# Patient Record
Sex: FEMALE | Race: WHITE | NOT HISPANIC OR LATINO | Employment: OTHER | URBAN - METROPOLITAN AREA
[De-identification: names, ages, dates, MRNs, and addresses within clinical notes are randomized per-mention and may not be internally consistent; named-entity substitution may affect disease eponyms.]

---

## 2021-11-02 RX ORDER — LEVOTHYROXINE SODIUM 0.07 MG/1
75 TABLET ORAL DAILY
COMMUNITY

## 2021-11-02 RX ORDER — PSEUDOEPHEDRINE HCL 30 MG
30 TABLET ORAL EVERY 4 HOURS PRN
Status: ON HOLD | COMMUNITY
End: 2021-11-10

## 2021-11-02 RX ORDER — ASCORBIC ACID 500 MG
500 TABLET ORAL DAILY
Status: ON HOLD | COMMUNITY
End: 2021-11-11

## 2021-11-02 RX ORDER — MULTIPLE VITAMINS W/ MINERALS TAB 9MG-400MCG
1 TAB ORAL DAILY
COMMUNITY

## 2021-11-02 RX ORDER — CITALOPRAM 20 MG/1
20 TABLET ORAL NIGHTLY
COMMUNITY
End: 2021-11-24 | Stop reason: ALTCHOICE

## 2021-11-02 RX ORDER — FLUTICASONE PROPIONATE 50 MCG
1 SPRAY, SUSPENSION (ML) NASAL DAILY
Status: ON HOLD | COMMUNITY
End: 2021-11-10

## 2021-11-02 RX ORDER — LORATADINE 10 MG/1
10 TABLET ORAL DAILY PRN
COMMUNITY

## 2021-11-02 RX ORDER — NAPROXEN SODIUM 220 MG
220 TABLET ORAL DAILY PRN
Status: ON HOLD | COMMUNITY
End: 2021-11-10

## 2021-11-02 RX ORDER — DEXTROAMPHETAMINE SACCHARATE, AMPHETAMINE ASPARTATE, DEXTROAMPHETAMINE SULFATE AND AMPHETAMINE SULFATE 5; 5; 5; 5 MG/1; MG/1; MG/1; MG/1
20 TABLET ORAL 2 TIMES DAILY PRN
Status: ON HOLD | COMMUNITY
End: 2021-11-10

## 2021-11-02 RX ORDER — DICYCLOMINE HYDROCHLORIDE 10 MG/1
10 CAPSULE ORAL 3 TIMES DAILY PRN
COMMUNITY

## 2021-11-02 RX ORDER — SPIRONOLACTONE 50 MG/1
50 TABLET, FILM COATED ORAL DAILY
Status: ON HOLD | COMMUNITY
End: 2021-11-10

## 2021-11-02 RX ORDER — OMEPRAZOLE 40 MG/1
40 CAPSULE, DELAYED RELEASE ORAL 2 TIMES DAILY
Status: ON HOLD | COMMUNITY
End: 2021-11-10

## 2021-11-02 RX ORDER — IBUPROFEN 200 MG
200 TABLET ORAL AS NEEDED
Status: ON HOLD | COMMUNITY
End: 2021-11-10

## 2021-11-02 RX ORDER — BUPROPION HYDROCHLORIDE 150 MG/1
150 TABLET, EXTENDED RELEASE ORAL NIGHTLY
Status: ON HOLD | COMMUNITY
End: 2021-11-10

## 2021-11-02 RX ORDER — ZOLPIDEM TARTRATE 10 MG/1
10 TABLET ORAL NIGHTLY PRN
Status: ON HOLD | COMMUNITY
End: 2021-11-11

## 2021-11-02 RX ORDER — ACETAMINOPHEN AND CODEINE PHOSPHATE 300; 30 MG/1; MG/1
1 TABLET ORAL AS NEEDED
Status: ON HOLD | COMMUNITY
End: 2021-11-10

## 2021-11-03 ENCOUNTER — HOSPITAL ENCOUNTER (OUTPATIENT)
Dept: GENERAL RADIOLOGY | Facility: HOSPITAL | Age: 66
Discharge: HOME | End: 2021-11-03

## 2021-11-03 ENCOUNTER — LAB (OUTPATIENT)
Dept: LAB | Facility: HOSPITAL | Age: 66
End: 2021-11-03

## 2021-11-03 ENCOUNTER — HOSPITAL ENCOUNTER (OUTPATIENT)
Dept: CARDIOLOGY | Facility: HOSPITAL | Age: 66
Discharge: HOME | End: 2021-11-03

## 2021-11-03 LAB
BASOPHILS # BLD AUTO: 0.1 10*3/MM3 (ref 0–0.2)
BASOPHILS NFR BLD AUTO: 1.2 % (ref 0–1.5)
DEPRECATED RDW RBC AUTO: 40.9 FL (ref 37–54)
EOSINOPHIL # BLD AUTO: 0.13 10*3/MM3 (ref 0–0.4)
EOSINOPHIL NFR BLD AUTO: 1.6 % (ref 0.3–6.2)
ERYTHROCYTE [DISTWIDTH] IN BLOOD BY AUTOMATED COUNT: 12.4 % (ref 12.3–15.4)
HCT VFR BLD AUTO: 37.4 % (ref 34–46.6)
HGB BLD-MCNC: 12.2 G/DL (ref 12–15.9)
IMM GRANULOCYTES # BLD AUTO: 0.03 10*3/MM3 (ref 0–0.05)
IMM GRANULOCYTES NFR BLD AUTO: 0.4 % (ref 0–0.5)
LYMPHOCYTES # BLD AUTO: 3.23 10*3/MM3 (ref 0.7–3.1)
LYMPHOCYTES NFR BLD AUTO: 39.8 % (ref 19.6–45.3)
MCH RBC QN AUTO: 29.8 PG (ref 26.6–33)
MCHC RBC AUTO-ENTMCNC: 32.6 G/DL (ref 31.5–35.7)
MCV RBC AUTO: 91.4 FL (ref 79–97)
MONOCYTES # BLD AUTO: 0.8 10*3/MM3 (ref 0.1–0.9)
MONOCYTES NFR BLD AUTO: 9.9 % (ref 5–12)
NEUTROPHILS NFR BLD AUTO: 3.83 10*3/MM3 (ref 1.7–7)
NEUTROPHILS NFR BLD AUTO: 47.1 % (ref 42.7–76)
NRBC BLD AUTO-RTO: 0 /100 WBC (ref 0–0.2)
PLATELET # BLD AUTO: 369 10*3/MM3 (ref 140–450)
PMV BLD AUTO: 10 FL (ref 6–12)
QT INTERVAL: 334 MS
RBC # BLD AUTO: 4.09 10*6/MM3 (ref 3.77–5.28)
WBC # BLD AUTO: 8.12 10*3/MM3 (ref 3.4–10.8)

## 2021-11-03 PROCEDURE — 93005 ELECTROCARDIOGRAM TRACING: CPT | Performed by: ORTHOPAEDIC SURGERY

## 2021-11-03 PROCEDURE — 85025 COMPLETE CBC W/AUTO DIFF WBC: CPT

## 2021-11-03 PROCEDURE — 93010 ELECTROCARDIOGRAM REPORT: CPT | Performed by: INTERNAL MEDICINE

## 2021-11-03 PROCEDURE — 71046 X-RAY EXAM CHEST 2 VIEWS: CPT

## 2021-11-08 ENCOUNTER — LAB (OUTPATIENT)
Dept: LAB | Facility: HOSPITAL | Age: 66
End: 2021-11-08

## 2021-11-08 PROCEDURE — U0005 INFEC AGEN DETEC AMPLI PROBE: HCPCS

## 2021-11-08 PROCEDURE — C9803 HOPD COVID-19 SPEC COLLECT: HCPCS

## 2021-11-08 PROCEDURE — U0004 COV-19 TEST NON-CDC HGH THRU: HCPCS

## 2021-11-09 ENCOUNTER — ANESTHESIA EVENT (OUTPATIENT)
Dept: PERIOP | Facility: HOSPITAL | Age: 66
End: 2021-11-09

## 2021-11-09 LAB — SARS-COV-2 ORF1AB RESP QL NAA+PROBE: NOT DETECTED

## 2021-11-10 ENCOUNTER — APPOINTMENT (OUTPATIENT)
Dept: GENERAL RADIOLOGY | Facility: HOSPITAL | Age: 66
End: 2021-11-10

## 2021-11-10 ENCOUNTER — HOSPITAL ENCOUNTER (INPATIENT)
Facility: HOSPITAL | Age: 66
LOS: 2 days | Discharge: NURSING, REHAB OR LONG-TERM CARE | End: 2021-11-13
Attending: ORTHOPAEDIC SURGERY | Admitting: ORTHOPAEDIC SURGERY

## 2021-11-10 ENCOUNTER — ANESTHESIA (OUTPATIENT)
Dept: PERIOP | Facility: HOSPITAL | Age: 66
End: 2021-11-10

## 2021-11-10 DIAGNOSIS — M16.11 PRIMARY OSTEOARTHRITIS OF RIGHT HIP: Primary | ICD-10-CM

## 2021-11-10 PROBLEM — F90.0 ATTENTION DEFICIT HYPERACTIVITY DISORDER, PREDOMINANTLY INATTENTIVE TYPE: Status: ACTIVE | Noted: 2021-11-10

## 2021-11-10 PROBLEM — E78.2 MIXED HYPERLIPIDEMIA: Chronic | Status: ACTIVE | Noted: 2019-12-09

## 2021-11-10 PROBLEM — K21.9 GASTROESOPHAGEAL REFLUX DISEASE: Status: ACTIVE | Noted: 2021-11-10

## 2021-11-10 PROBLEM — F41.9 ANXIETY DISORDER: Chronic | Status: ACTIVE | Noted: 2021-11-10

## 2021-11-10 PROBLEM — E03.9 HYPOTHYROIDISM: Chronic | Status: ACTIVE | Noted: 2021-11-10

## 2021-11-10 PROBLEM — M54.2 NECK PAIN: Status: ACTIVE | Noted: 2021-11-10

## 2021-11-10 PROBLEM — F41.9 ANXIETY DISORDER: Status: ACTIVE | Noted: 2021-11-10

## 2021-11-10 PROBLEM — F32.A DEPRESSIVE DISORDER: Status: ACTIVE | Noted: 2019-05-02

## 2021-11-10 PROBLEM — K58.9 IRRITABLE BOWEL SYNDROME: Chronic | Status: ACTIVE | Noted: 2021-11-10

## 2021-11-10 PROBLEM — K58.9 IRRITABLE BOWEL SYNDROME: Status: ACTIVE | Noted: 2021-11-10

## 2021-11-10 PROBLEM — F90.0 ATTENTION DEFICIT HYPERACTIVITY DISORDER, PREDOMINANTLY INATTENTIVE TYPE: Chronic | Status: ACTIVE | Noted: 2021-11-10

## 2021-11-10 PROBLEM — G47.00 INSOMNIA: Chronic | Status: ACTIVE | Noted: 2021-11-10

## 2021-11-10 PROBLEM — E66.3 OVERWEIGHT (BMI 25.0-29.9): Chronic | Status: ACTIVE | Noted: 2021-11-10

## 2021-11-10 PROBLEM — E03.9 HYPOTHYROIDISM: Status: ACTIVE | Noted: 2021-11-10

## 2021-11-10 PROBLEM — G89.29 CHRONIC BACK PAIN: Status: ACTIVE | Noted: 2021-11-10

## 2021-11-10 PROBLEM — K21.9 GASTROESOPHAGEAL REFLUX DISEASE: Chronic | Status: ACTIVE | Noted: 2021-11-10

## 2021-11-10 PROBLEM — M81.0 OSTEOPOROSIS: Status: ACTIVE | Noted: 2021-11-10

## 2021-11-10 PROBLEM — M54.9 CHRONIC BACK PAIN: Status: ACTIVE | Noted: 2021-11-10

## 2021-11-10 PROBLEM — E78.5 HYPERLIPIDEMIA: Status: ACTIVE | Noted: 2019-12-09

## 2021-11-10 PROBLEM — F32.A DEPRESSIVE DISORDER: Chronic | Status: ACTIVE | Noted: 2019-05-02

## 2021-11-10 LAB
BACTERIA UR QL AUTO: NORMAL /HPF
HYALINE CASTS UR QL AUTO: NORMAL /LPF
RBC # UR: NORMAL /HPF
REF LAB TEST METHOD: NORMAL
SQUAMOUS #/AREA URNS HPF: NORMAL /HPF
WBC UR QL AUTO: NORMAL /HPF

## 2021-11-10 PROCEDURE — C1776 JOINT DEVICE (IMPLANTABLE): HCPCS | Performed by: ORTHOPAEDIC SURGERY

## 2021-11-10 PROCEDURE — 25010000002 CEFAZOLIN PER 500 MG: Performed by: ORTHOPAEDIC SURGERY

## 2021-11-10 PROCEDURE — G0378 HOSPITAL OBSERVATION PER HR: HCPCS

## 2021-11-10 PROCEDURE — 27130 TOTAL HIP ARTHROPLASTY: CPT | Performed by: NURSE PRACTITIONER

## 2021-11-10 PROCEDURE — 25010000002 ONDANSETRON PER 1 MG

## 2021-11-10 PROCEDURE — 25010000002 PROPOFOL 10 MG/ML EMULSION

## 2021-11-10 PROCEDURE — 25010000002 MIDAZOLAM PER 1 MG

## 2021-11-10 PROCEDURE — 0SR90JA REPLACEMENT OF RIGHT HIP JOINT WITH SYNTHETIC SUBSTITUTE, UNCEMENTED, OPEN APPROACH: ICD-10-PCS | Performed by: ORTHOPAEDIC SURGERY

## 2021-11-10 PROCEDURE — 25010000002 FENTANYL CITRATE (PF) 50 MCG/ML SOLUTION

## 2021-11-10 PROCEDURE — 25010000002 DEXAMETHASONE PER 1 MG

## 2021-11-10 PROCEDURE — 25010000002 HYDROMORPHONE PER 4 MG

## 2021-11-10 PROCEDURE — 97162 PT EVAL MOD COMPLEX 30 MIN: CPT

## 2021-11-10 PROCEDURE — 99222 1ST HOSP IP/OBS MODERATE 55: CPT | Performed by: INTERNAL MEDICINE

## 2021-11-10 PROCEDURE — 81015 MICROSCOPIC EXAM OF URINE: CPT | Performed by: ORTHOPAEDIC SURGERY

## 2021-11-10 PROCEDURE — 97110 THERAPEUTIC EXERCISES: CPT

## 2021-11-10 PROCEDURE — 73501 X-RAY EXAM HIP UNI 1 VIEW: CPT

## 2021-11-10 PROCEDURE — 76000 FLUOROSCOPY <1 HR PHYS/QHP: CPT

## 2021-11-10 PROCEDURE — 25010000002 NEOSTIGMINE 5 MG/5ML SOLUTION

## 2021-11-10 PROCEDURE — 25010000002 MAGNESIUM SULFATE PER 500 MG OF MAGNESIUM

## 2021-11-10 DEVICE — LINER ACET G7 VIVACIT/E NTRL HXPE SZF 36MM: Type: IMPLANTABLE DEVICE | Site: HIP | Status: FUNCTIONAL

## 2021-11-10 DEVICE — CP HIP UPCHRG OSSEOTI LTD HL CUPS: Type: IMPLANTABLE DEVICE | Site: HIP | Status: FUNCTIONAL

## 2021-11-10 DEVICE — SHLL ACET OSSEOTI G7 4H SZF 54MM: Type: IMPLANTABLE DEVICE | Site: HIP | Status: FUNCTIONAL

## 2021-11-10 DEVICE — STEM FEM/HIP AVENIRCOMPLETE COLAR STFF HA SZ5: Type: IMPLANTABLE DEVICE | Site: HIP | Status: FUNCTIONAL

## 2021-11-10 DEVICE — SCRW ACET CORT TRILOGY S/TAP 6.5X15: Type: IMPLANTABLE DEVICE | Site: HIP | Status: FUNCTIONAL

## 2021-11-10 DEVICE — BIOLOX® DELTA, CERAMIC FEMORAL HEAD, S, Ø 36/-3.5, TAPER 12/14
Type: IMPLANTABLE DEVICE | Site: HIP | Status: FUNCTIONAL
Brand: BIOLOX® DELTA

## 2021-11-10 DEVICE — TOTAL HIP PRIMARY: Type: IMPLANTABLE DEVICE | Site: HIP | Status: FUNCTIONAL

## 2021-11-10 RX ORDER — HYDROMORPHONE HCL 110MG/55ML
0.5 PATIENT CONTROLLED ANALGESIA SYRINGE INTRAVENOUS
Status: DISCONTINUED | OUTPATIENT
Start: 2021-11-10 | End: 2021-11-10 | Stop reason: HOSPADM

## 2021-11-10 RX ORDER — ASCORBIC ACID 500 MG
500 TABLET ORAL DAILY
Status: DISCONTINUED | OUTPATIENT
Start: 2021-11-10 | End: 2021-11-13 | Stop reason: HOSPADM

## 2021-11-10 RX ORDER — CITALOPRAM 20 MG/1
20 TABLET ORAL NIGHTLY
Status: DISCONTINUED | OUTPATIENT
Start: 2021-11-10 | End: 2021-11-13 | Stop reason: HOSPADM

## 2021-11-10 RX ORDER — OXYCODONE HYDROCHLORIDE 5 MG/1
10 TABLET ORAL EVERY 4 HOURS PRN
Status: DISCONTINUED | OUTPATIENT
Start: 2021-11-10 | End: 2021-11-11 | Stop reason: SDUPTHER

## 2021-11-10 RX ORDER — MULTIPLE VITAMINS W/ MINERALS TAB 9MG-400MCG
1 TAB ORAL DAILY
Status: DISCONTINUED | OUTPATIENT
Start: 2021-11-10 | End: 2021-11-13 | Stop reason: HOSPADM

## 2021-11-10 RX ORDER — NALOXONE HCL 0.4 MG/ML
0.1 VIAL (ML) INJECTION
Status: DISCONTINUED | OUTPATIENT
Start: 2021-11-10 | End: 2021-11-13 | Stop reason: HOSPADM

## 2021-11-10 RX ORDER — EPHEDRINE SULFATE 5 MG/ML
5 INJECTION INTRAVENOUS ONCE AS NEEDED
Status: DISCONTINUED | OUTPATIENT
Start: 2021-11-10 | End: 2021-11-10 | Stop reason: HOSPADM

## 2021-11-10 RX ORDER — ZOLPIDEM TARTRATE 5 MG/1
5 TABLET ORAL NIGHTLY PRN
Status: DISCONTINUED | OUTPATIENT
Start: 2021-11-10 | End: 2021-11-13 | Stop reason: HOSPADM

## 2021-11-10 RX ORDER — LABETALOL HYDROCHLORIDE 5 MG/ML
5 INJECTION, SOLUTION INTRAVENOUS
Status: DISCONTINUED | OUTPATIENT
Start: 2021-11-10 | End: 2021-11-10 | Stop reason: HOSPADM

## 2021-11-10 RX ORDER — EPHEDRINE SULFATE 5 MG/ML
INJECTION INTRAVENOUS AS NEEDED
Status: DISCONTINUED | OUTPATIENT
Start: 2021-11-10 | End: 2021-11-10 | Stop reason: SURG

## 2021-11-10 RX ORDER — ONDANSETRON 4 MG/1
4 TABLET, FILM COATED ORAL EVERY 6 HOURS PRN
Status: CANCELLED | OUTPATIENT
Start: 2021-11-10

## 2021-11-10 RX ORDER — FENTANYL CITRATE 50 UG/ML
INJECTION, SOLUTION INTRAMUSCULAR; INTRAVENOUS AS NEEDED
Status: DISCONTINUED | OUTPATIENT
Start: 2021-11-10 | End: 2021-11-10 | Stop reason: SURG

## 2021-11-10 RX ORDER — PROMETHAZINE HYDROCHLORIDE 25 MG/1
25 TABLET ORAL ONCE AS NEEDED
Status: DISCONTINUED | OUTPATIENT
Start: 2021-11-10 | End: 2021-11-10 | Stop reason: HOSPADM

## 2021-11-10 RX ORDER — LIDOCAINE HYDROCHLORIDE 20 MG/ML
INJECTION, SOLUTION EPIDURAL; INFILTRATION; INTRACAUDAL; PERINEURAL AS NEEDED
Status: DISCONTINUED | OUTPATIENT
Start: 2021-11-10 | End: 2021-11-10 | Stop reason: SURG

## 2021-11-10 RX ORDER — GLYCOPYRROLATE 1 MG/5 ML
SYRINGE (ML) INTRAVENOUS AS NEEDED
Status: DISCONTINUED | OUTPATIENT
Start: 2021-11-10 | End: 2021-11-10 | Stop reason: SURG

## 2021-11-10 RX ORDER — FAMOTIDINE 20 MG/1
40 TABLET, FILM COATED ORAL DAILY
Status: CANCELLED | OUTPATIENT
Start: 2021-11-10

## 2021-11-10 RX ORDER — NALOXONE HCL 0.4 MG/ML
0.4 VIAL (ML) INJECTION AS NEEDED
Status: DISCONTINUED | OUTPATIENT
Start: 2021-11-10 | End: 2021-11-10 | Stop reason: HOSPADM

## 2021-11-10 RX ORDER — FENTANYL CITRATE 50 UG/ML
50 INJECTION, SOLUTION INTRAMUSCULAR; INTRAVENOUS
Status: DISCONTINUED | OUTPATIENT
Start: 2021-11-10 | End: 2021-11-10 | Stop reason: HOSPADM

## 2021-11-10 RX ORDER — LEVOTHYROXINE SODIUM 0.07 MG/1
75 TABLET ORAL DAILY
Status: DISCONTINUED | OUTPATIENT
Start: 2021-11-10 | End: 2021-11-13 | Stop reason: HOSPADM

## 2021-11-10 RX ORDER — HYDROMORPHONE HCL 110MG/55ML
PATIENT CONTROLLED ANALGESIA SYRINGE INTRAVENOUS AS NEEDED
Status: DISCONTINUED | OUTPATIENT
Start: 2021-11-10 | End: 2021-11-10 | Stop reason: SURG

## 2021-11-10 RX ORDER — KETAMINE HCL IN NACL, ISO-OSM 100MG/10ML
SYRINGE (ML) INJECTION AS NEEDED
Status: DISCONTINUED | OUTPATIENT
Start: 2021-11-10 | End: 2021-11-10 | Stop reason: SURG

## 2021-11-10 RX ORDER — CETIRIZINE HYDROCHLORIDE 10 MG/1
10 TABLET ORAL DAILY
Status: DISCONTINUED | OUTPATIENT
Start: 2021-11-10 | End: 2021-11-13 | Stop reason: HOSPADM

## 2021-11-10 RX ORDER — NALOXONE HCL 0.4 MG/ML
0.1 VIAL (ML) INJECTION
Status: CANCELLED | OUTPATIENT
Start: 2021-11-10

## 2021-11-10 RX ORDER — HYDRALAZINE HYDROCHLORIDE 20 MG/ML
5 INJECTION INTRAMUSCULAR; INTRAVENOUS
Status: DISCONTINUED | OUTPATIENT
Start: 2021-11-10 | End: 2021-11-10 | Stop reason: HOSPADM

## 2021-11-10 RX ORDER — NEOSTIGMINE METHYLSULFATE 5 MG/5 ML
SYRINGE (ML) INTRAVENOUS AS NEEDED
Status: DISCONTINUED | OUTPATIENT
Start: 2021-11-10 | End: 2021-11-10 | Stop reason: SURG

## 2021-11-10 RX ORDER — ONDANSETRON 2 MG/ML
4 INJECTION INTRAMUSCULAR; INTRAVENOUS EVERY 6 HOURS PRN
Status: DISCONTINUED | OUTPATIENT
Start: 2021-11-10 | End: 2021-11-13 | Stop reason: HOSPADM

## 2021-11-10 RX ORDER — ROCURONIUM BROMIDE 10 MG/ML
INJECTION, SOLUTION INTRAVENOUS AS NEEDED
Status: DISCONTINUED | OUTPATIENT
Start: 2021-11-10 | End: 2021-11-10 | Stop reason: SURG

## 2021-11-10 RX ORDER — SODIUM CHLORIDE 0.9 % (FLUSH) 0.9 %
10 SYRINGE (ML) INJECTION AS NEEDED
Status: DISCONTINUED | OUTPATIENT
Start: 2021-11-10 | End: 2021-11-10 | Stop reason: HOSPADM

## 2021-11-10 RX ORDER — HYDROMORPHONE HCL 110MG/55ML
0.5 PATIENT CONTROLLED ANALGESIA SYRINGE INTRAVENOUS
Status: DISCONTINUED | OUTPATIENT
Start: 2021-11-10 | End: 2021-11-13 | Stop reason: HOSPADM

## 2021-11-10 RX ORDER — SODIUM CHLORIDE, SODIUM LACTATE, POTASSIUM CHLORIDE, CALCIUM CHLORIDE 600; 310; 30; 20 MG/100ML; MG/100ML; MG/100ML; MG/100ML
1000 INJECTION, SOLUTION INTRAVENOUS CONTINUOUS
Status: DISCONTINUED | OUTPATIENT
Start: 2021-11-10 | End: 2021-11-10

## 2021-11-10 RX ORDER — SODIUM CHLORIDE, SODIUM LACTATE, POTASSIUM CHLORIDE, CALCIUM CHLORIDE 600; 310; 30; 20 MG/100ML; MG/100ML; MG/100ML; MG/100ML
75 INJECTION, SOLUTION INTRAVENOUS CONTINUOUS
Status: DISCONTINUED | OUTPATIENT
Start: 2021-11-10 | End: 2021-11-13 | Stop reason: HOSPADM

## 2021-11-10 RX ORDER — ONDANSETRON 2 MG/ML
4 INJECTION INTRAMUSCULAR; INTRAVENOUS ONCE AS NEEDED
Status: DISCONTINUED | OUTPATIENT
Start: 2021-11-10 | End: 2021-11-10 | Stop reason: HOSPADM

## 2021-11-10 RX ORDER — DEXAMETHASONE SODIUM PHOSPHATE 4 MG/ML
INJECTION, SOLUTION INTRA-ARTICULAR; INTRALESIONAL; INTRAMUSCULAR; INTRAVENOUS; SOFT TISSUE AS NEEDED
Status: DISCONTINUED | OUTPATIENT
Start: 2021-11-10 | End: 2021-11-10 | Stop reason: SURG

## 2021-11-10 RX ORDER — LIDOCAINE HYDROCHLORIDE 10 MG/ML
0.5 INJECTION, SOLUTION INFILTRATION; PERINEURAL ONCE AS NEEDED
Status: DISCONTINUED | OUTPATIENT
Start: 2021-11-10 | End: 2021-11-10 | Stop reason: HOSPADM

## 2021-11-10 RX ORDER — TRANEXAMIC ACID 10 MG/ML
1000 INJECTION, SOLUTION INTRAVENOUS ONCE
Status: COMPLETED | OUTPATIENT
Start: 2021-11-10 | End: 2021-11-10

## 2021-11-10 RX ORDER — ONDANSETRON 2 MG/ML
INJECTION INTRAMUSCULAR; INTRAVENOUS AS NEEDED
Status: DISCONTINUED | OUTPATIENT
Start: 2021-11-10 | End: 2021-11-10 | Stop reason: SURG

## 2021-11-10 RX ORDER — SODIUM CHLORIDE, SODIUM LACTATE, POTASSIUM CHLORIDE, CALCIUM CHLORIDE 600; 310; 30; 20 MG/100ML; MG/100ML; MG/100ML; MG/100ML
75 INJECTION, SOLUTION INTRAVENOUS CONTINUOUS
Status: CANCELLED | OUTPATIENT
Start: 2021-11-10

## 2021-11-10 RX ORDER — PROPOFOL 10 MG/ML
VIAL (ML) INTRAVENOUS AS NEEDED
Status: DISCONTINUED | OUTPATIENT
Start: 2021-11-10 | End: 2021-11-10 | Stop reason: SURG

## 2021-11-10 RX ORDER — HYDROMORPHONE HCL 110MG/55ML
0.5 PATIENT CONTROLLED ANALGESIA SYRINGE INTRAVENOUS
Status: CANCELLED | OUTPATIENT
Start: 2021-11-10 | End: 2021-11-17

## 2021-11-10 RX ORDER — HYDROCODONE BITARTRATE AND ACETAMINOPHEN 5; 325 MG/1; MG/1
1 TABLET ORAL EVERY 4 HOURS PRN
Status: DISCONTINUED | OUTPATIENT
Start: 2021-11-10 | End: 2021-11-13 | Stop reason: HOSPADM

## 2021-11-10 RX ORDER — FAMOTIDINE 20 MG/1
40 TABLET, FILM COATED ORAL DAILY
Status: DISCONTINUED | OUTPATIENT
Start: 2021-11-10 | End: 2021-11-11

## 2021-11-10 RX ORDER — DICYCLOMINE HYDROCHLORIDE 10 MG/1
10 CAPSULE ORAL 3 TIMES DAILY
Status: DISCONTINUED | OUTPATIENT
Start: 2021-11-10 | End: 2021-11-13 | Stop reason: HOSPADM

## 2021-11-10 RX ORDER — ONDANSETRON 2 MG/ML
4 INJECTION INTRAMUSCULAR; INTRAVENOUS EVERY 6 HOURS PRN
Status: CANCELLED | OUTPATIENT
Start: 2021-11-10

## 2021-11-10 RX ORDER — MIDAZOLAM HYDROCHLORIDE 1 MG/ML
INJECTION INTRAMUSCULAR; INTRAVENOUS AS NEEDED
Status: DISCONTINUED | OUTPATIENT
Start: 2021-11-10 | End: 2021-11-10 | Stop reason: SURG

## 2021-11-10 RX ORDER — HYDROCODONE BITARTRATE AND ACETAMINOPHEN 5; 325 MG/1; MG/1
1 TABLET ORAL EVERY 4 HOURS PRN
Status: CANCELLED | OUTPATIENT
Start: 2021-11-10 | End: 2021-11-20

## 2021-11-10 RX ORDER — ACETAMINOPHEN 500 MG
TABLET ORAL AS NEEDED
Status: DISCONTINUED | OUTPATIENT
Start: 2021-11-10 | End: 2021-11-10 | Stop reason: SURG

## 2021-11-10 RX ORDER — FLUMAZENIL 0.1 MG/ML
0.2 INJECTION INTRAVENOUS AS NEEDED
Status: DISCONTINUED | OUTPATIENT
Start: 2021-11-10 | End: 2021-11-10 | Stop reason: HOSPADM

## 2021-11-10 RX ORDER — LIDOCAINE HYDROCHLORIDE 10 MG/ML
INJECTION, SOLUTION EPIDURAL; INFILTRATION; INTRACAUDAL; PERINEURAL AS NEEDED
Status: DISCONTINUED | OUTPATIENT
Start: 2021-11-10 | End: 2021-11-10 | Stop reason: SURG

## 2021-11-10 RX ORDER — SCOLOPAMINE TRANSDERMAL SYSTEM 1 MG/1
PATCH, EXTENDED RELEASE TRANSDERMAL AS NEEDED
Status: DISCONTINUED | OUTPATIENT
Start: 2021-11-10 | End: 2021-11-10 | Stop reason: SURG

## 2021-11-10 RX ORDER — OXYCODONE HYDROCHLORIDE 5 MG/1
10 TABLET ORAL EVERY 4 HOURS PRN
Status: DISCONTINUED | OUTPATIENT
Start: 2021-11-10 | End: 2021-11-13 | Stop reason: HOSPADM

## 2021-11-10 RX ORDER — ONDANSETRON 4 MG/1
4 TABLET, FILM COATED ORAL EVERY 6 HOURS PRN
Status: DISCONTINUED | OUTPATIENT
Start: 2021-11-10 | End: 2021-11-13 | Stop reason: HOSPADM

## 2021-11-10 RX ORDER — MAGNESIUM SULFATE HEPTAHYDRATE 500 MG/ML
INJECTION, SOLUTION INTRAMUSCULAR; INTRAVENOUS AS NEEDED
Status: DISCONTINUED | OUTPATIENT
Start: 2021-11-10 | End: 2021-11-10 | Stop reason: SURG

## 2021-11-10 RX ADMIN — OXYCODONE 10 MG: 5 TABLET ORAL at 15:31

## 2021-11-10 RX ADMIN — FENTANYL CITRATE 50 MCG: 50 INJECTION, SOLUTION INTRAMUSCULAR; INTRAVENOUS at 09:57

## 2021-11-10 RX ADMIN — GLYCOPYRROLATE 1 MCG: 0.2 INJECTION, SOLUTION INTRAMUSCULAR; INTRAVITREAL at 11:34

## 2021-11-10 RX ADMIN — Medication 50 MG: at 10:07

## 2021-11-10 RX ADMIN — SODIUM CHLORIDE, POTASSIUM CHLORIDE, SODIUM LACTATE AND CALCIUM CHLORIDE: 600; 310; 30; 20 INJECTION, SOLUTION INTRAVENOUS at 11:16

## 2021-11-10 RX ADMIN — ROCURONIUM BROMIDE 20 MG: 10 INJECTION INTRAVENOUS at 11:05

## 2021-11-10 RX ADMIN — DICYCLOMINE HYDROCHLORIDE 10 MG: 10 CAPSULE ORAL at 17:03

## 2021-11-10 RX ADMIN — OXYCODONE HYDROCHLORIDE AND ACETAMINOPHEN 500 MG: 500 TABLET ORAL at 17:03

## 2021-11-10 RX ADMIN — MULTIPLE VITAMINS W/ MINERALS TAB 1 TABLET: TAB at 17:02

## 2021-11-10 RX ADMIN — CEFAZOLIN SODIUM 2 G: 1 INJECTION, POWDER, FOR SOLUTION INTRAMUSCULAR; INTRAVENOUS at 10:07

## 2021-11-10 RX ADMIN — LIDOCAINE HYDROCHLORIDE 100 MG: 20 INJECTION, SOLUTION EPIDURAL; INFILTRATION; INTRACAUDAL; PERINEURAL at 10:07

## 2021-11-10 RX ADMIN — CETIRIZINE HYDROCHLORIDE 10 MG: 10 TABLET, FILM COATED ORAL at 17:03

## 2021-11-10 RX ADMIN — LIDOCAINE HYDROCHLORIDE 50 MG: 10 INJECTION, SOLUTION EPIDURAL; INFILTRATION; INTRACAUDAL; PERINEURAL at 10:04

## 2021-11-10 RX ADMIN — CEFAZOLIN SODIUM 2 G: 10 INJECTION, POWDER, FOR SOLUTION INTRAVENOUS at 17:02

## 2021-11-10 RX ADMIN — SODIUM CHLORIDE, POTASSIUM CHLORIDE, SODIUM LACTATE AND CALCIUM CHLORIDE 75 ML/HR: 600; 310; 30; 20 INJECTION, SOLUTION INTRAVENOUS at 17:13

## 2021-11-10 RX ADMIN — Medication 5 MG: at 11:34

## 2021-11-10 RX ADMIN — EPHEDRINE SULFATE 10 MG: 5 INJECTION INTRAVENOUS at 11:17

## 2021-11-10 RX ADMIN — SCOPALAMINE 1 PATCH: 1 PATCH, EXTENDED RELEASE TRANSDERMAL at 08:12

## 2021-11-10 RX ADMIN — GLYCOPYRROLATE 0.2 MCG: 0.2 INJECTION, SOLUTION INTRAMUSCULAR; INTRAVITREAL at 11:07

## 2021-11-10 RX ADMIN — HYDROMORPHONE HYDROCHLORIDE 0.5 MG: 2 INJECTION, SOLUTION INTRAMUSCULAR; INTRAVENOUS; SUBCUTANEOUS at 12:43

## 2021-11-10 RX ADMIN — ROCURONIUM BROMIDE 40 MG: 10 INJECTION INTRAVENOUS at 10:04

## 2021-11-10 RX ADMIN — DICYCLOMINE HYDROCHLORIDE 10 MG: 10 CAPSULE ORAL at 20:30

## 2021-11-10 RX ADMIN — ONDANSETRON 4 MG: 2 INJECTION INTRAMUSCULAR; INTRAVENOUS at 11:22

## 2021-11-10 RX ADMIN — FAMOTIDINE 40 MG: 20 TABLET ORAL at 17:02

## 2021-11-10 RX ADMIN — FENTANYL CITRATE 50 MCG: 50 INJECTION, SOLUTION INTRAMUSCULAR; INTRAVENOUS at 10:28

## 2021-11-10 RX ADMIN — OXYCODONE 10 MG: 5 TABLET ORAL at 20:30

## 2021-11-10 RX ADMIN — PROPOFOL 160 MG: 10 INJECTION, EMULSION INTRAVENOUS at 10:04

## 2021-11-10 RX ADMIN — ROCURONIUM BROMIDE 20 MG: 10 INJECTION INTRAVENOUS at 10:32

## 2021-11-10 RX ADMIN — HYDROMORPHONE HYDROCHLORIDE 0.5 MG: 2 INJECTION, SOLUTION INTRAMUSCULAR; INTRAVENOUS; SUBCUTANEOUS at 12:31

## 2021-11-10 RX ADMIN — MIDAZOLAM 2 MG: 1 INJECTION INTRAMUSCULAR; INTRAVENOUS at 09:57

## 2021-11-10 RX ADMIN — CITALOPRAM HYDROBROMIDE 20 MG: 20 TABLET ORAL at 20:30

## 2021-11-10 RX ADMIN — SODIUM CHLORIDE, POTASSIUM CHLORIDE, SODIUM LACTATE AND CALCIUM CHLORIDE 1000 ML: 600; 310; 30; 20 INJECTION, SOLUTION INTRAVENOUS at 08:44

## 2021-11-10 RX ADMIN — HYDROMORPHONE HYDROCHLORIDE 0.5 MG: 2 INJECTION, SOLUTION INTRAMUSCULAR; INTRAVENOUS; SUBCUTANEOUS at 12:18

## 2021-11-10 RX ADMIN — ACETAMINOPHEN 1000 MG: 500 TABLET, FILM COATED ORAL at 08:12

## 2021-11-10 RX ADMIN — PROPOFOL 200 MCG/KG/MIN: 10 INJECTION, EMULSION INTRAVENOUS at 10:13

## 2021-11-10 RX ADMIN — PROPOFOL 150 MCG/KG/MIN: 10 INJECTION, EMULSION INTRAVENOUS at 10:07

## 2021-11-10 RX ADMIN — OXYCODONE 10 MG: 5 TABLET ORAL at 15:35

## 2021-11-10 RX ADMIN — SODIUM CHLORIDE, POTASSIUM CHLORIDE, SODIUM LACTATE AND CALCIUM CHLORIDE 1000 ML: 600; 310; 30; 20 INJECTION, SOLUTION INTRAVENOUS at 14:54

## 2021-11-10 RX ADMIN — DEXAMETHASONE SODIUM PHOSPHATE 4 MG: 4 INJECTION, SOLUTION INTRAMUSCULAR; INTRAVENOUS at 10:25

## 2021-11-10 RX ADMIN — HYDROMORPHONE HYDROCHLORIDE 0.5 MG: 2 INJECTION INTRAMUSCULAR; INTRAVENOUS; SUBCUTANEOUS at 10:40

## 2021-11-10 RX ADMIN — MAGNESIUM SULFATE HEPTAHYDRATE 2 G: 500 INJECTION, SOLUTION INTRAMUSCULAR; INTRAVENOUS at 10:07

## 2021-11-10 RX ADMIN — TRANEXAMIC ACID 1000 MG: 10 INJECTION, SOLUTION INTRAVENOUS at 10:08

## 2021-11-10 NOTE — THERAPY EVALUATION
Patient Name: Devika Jones  : 1955    MRN: [id number]                              Today's Date: 11/10/2021       Admit Date: 11/10/2021    Visit Dx:     ICD-10-CM ICD-9-CM   1. Primary osteoarthritis of right hip  M16.11 715.15     Patient Active Problem List   Diagnosis   • Osteoarthritis of right hip   • Anxiety disorder   • Attention deficit hyperactivity disorder, predominantly inattentive type   • Chronic back pain   • Depressive disorder   • Gastroesophageal reflux disease   • Hyperlipidemia   • Hypothyroidism   • Irritable bowel syndrome   • Osteoporosis   • Neck pain   • Insomnia   • Overweight (BMI 25.0-29.9)     Past Medical History:   Diagnosis Date   • ADD (attention deficit disorder)    • Allergies    • Arthritis    • Samuel's esophagus    • Cellulitis     post hysterectomy   • Cervical stenosis of spine     congenital   • Delayed emergence from anesthesia    • Depression    • Diverticular disease mild   • Frequent headaches    • GERD (gastroesophageal reflux disease)    • Hx of migraines    • Hypothyroid    • Insomnia    • Osteopenia    • Peripheral edema    • PONV (postoperative nausea and vomiting)    • Sleep apnea     cpap not using due to recall     Past Surgical History:   Procedure Laterality Date   • COLONOSCOPY      multiple   • ENDOSCOPY      dilatation yearly   • HYSTERECTOMY      cellulitis   • INCISION AND DRAINAGE BREAST ABSCESS Left 1960   • LUMBAR DISCECTOMY      L4   • TONSILLECTOMY     • VAGINAL DELIVERY      post delivery bleeding      General Information     Row Name 11/10/21 1651          Physical Therapy Time and Intention    Document Type evaluation  -EL     Mode of Treatment individual therapy; physical therapy  -EL     Row Name 11/10/21 165          General Information    Patient Profile Reviewed yes  -EL     Prior Level of Function independent:; all household mobility; ADL's  -EL     Row Name 11/10/21 1651          Living Environment    Lives  With spouse  spouse is dependent on pt  -[facility] Name 11/10/21 1651          Home Main Entrance    Number of Stairs, Main Entrance four  -[facility] Name 11/10/21 1651          Cognition    Orientation Status (Cognition) oriented x 4  -[facility] Name 11/10/21 1651          Safety Issues, Functional Mobility    Impairments Affecting Function (Mobility) sensation/sensory awareness; endurance/activity tolerance; pain; balance; strength  -           User Key  (r) = Recorded By, (t) = Taken By, (c) = Cosigned By    Initials Name Provider Type    Robb Haines, BERNICE Physical Therapist               Mobility     Row Name 11/10/21 1655          Bed Mobility    Bed Mobility supine-sit  -EL     Supine-Sit [place] (Bed Mobility) minimum assist (75% patient effort)  -EL     Row Name 11/10/21 1655          Transfers    Comment (Transfers) Pt became hypotensive when standing, required cueing and assistance for transfer to chair.  -EL     Row Name 11/10/21 1655          Bed-Chair Transfer    Bed-Chair [place] (Transfers) minimum assist (75% patient effort)  -EL     Assistive Device (Bed-Chair Transfers) walker, front-wheeled  -[facility] Name 11/10/21 1655          Sit-Stand Transfer    Sit-Stand [place] (Transfers) minimum assist (75% patient effort)  -EL     Assistive Device (Sit-Stand Transfers) walker, front-wheeled  -EL     Row Name 11/10/21 1655          Gait/Stairs (Locomotion)    [place] Level (Gait) minimum assist (75% patient effort)  -EL     Assistive Device (Gait) walker, front-wheeled  -EL     Distance in Feet (Gait) 5  -EL     Row Name 11/10/21 1655          Mobility    Extremity Weight-bearing Status right lower extremity  -EL     Right Lower Extremity (Weight-bearing Status) weight-bearing as tolerated (WBAT)  -           User Key  (r) = Recorded By, (t) = Taken By, (c) = Cosigned By    Initials Name Provider Type    Robb Haines PT Physical Therapist               Obj/Interventions      Row Name 11/10/21 1656          Range of Motion Comprehensive    General Range of Motion bilateral lower extremity ROM WFL  -EL     Row Name 11/10/21 1656          Strength Comprehensive (MMT)    General Manual Muscle Testing (MMT) Assessment lower extremity strength deficits identified  -EL     Comment, General Manual Muscle Testing (MMT) Assessment RLE not assessed, LLE 4/5 gross  -EL     Row Name 11/10/21 1656          Balance    Balance Assessment sitting static balance; standing static balance; standing dynamic balance  -EL     Static Sitting Balance WFL  -EL     Static Standing Balance mild impairment  -EL     Dynamic Standing Balance mild impairment  -EL           User Key  (r) = Recorded By, (t) = Taken By, (c) = Cosigned By    Initials Name Provider Type    Robb Haines, PT Physical Therapist               Goals/Plan     Row Name 11/10/21 1700          Bed Mobility Goal 1 (PT)    Activity/Assistive Device (Bed Mobility Goal 1, PT) bed mobility activities, all  -EL     [place] Level/Cues Needed (Bed Mobility Goal 1, PT) modified independence  -EL     Time Frame (Bed Mobility Goal 1, PT) long term goal (LTG); 2 weeks  -EL     Row Name 11/10/21 1700          Transfer Goal 1 (PT)    Activity/Assistive Device (Transfer Goal 1, PT) transfers, all  -EL     [place] Level/Cues Needed (Transfer Goal 1, PT) modified independence  -EL     Time Frame (Transfer Goal 1, PT) long term goal (LTG); 2 weeks  -EL     Row Name 11/10/21 1700          Gait Training Goal 1 (PT)    Activity/Assistive Device (Gait Training Goal 1, PT) gait (walking locomotion); walker, rolling  -EL     [place] Level (Gait Training Goal 1, PT) modified independence  -EL     Distance (Gait Training Goal 1, PT) 200  -EL     Time Frame (Gait Training Goal 1, PT) long term goal (LTG); 2 weeks  -EL           User Key  (r) = Recorded By, (t) = Taken By, (c) = Cosigned By    Initials Name Provider Type    Robb Haines, PT Physical Therapist                Clinical Impression     Row Name 11/10/21 1657          Pain    Additional Documentation Pain Scale: Numbers Pre/Post-Treatment (Group)  -EL     Row Name 11/10/21 1657          Pain Scale: Numbers Pre/Post-Treatment    Pretreatment Pain Rating 10/10  -EL     Posttreatment Pain Rating 9/10  -EL     Pain Location - Side Right  -EL     Pain Location - Orientation lower  -EL     Pain Location extremity  -EL     Row Name 11/10/21 1657          Plan of Care Review    Plan of Care Reviewed With patient; son  -EL     Outcome Summary Pt is a 65 YO F s/p R ELIZABETH. Pt reports living at home with spouse, who she physically cares for. Pt typically very independent, but expressess concerns that she will have to physically care for  before she is ready.  is currently staying with family while pt is admitted. Pt this date requires MIN A for mobility with RWx but became hypotensive with mobiltiy. Pt assisted with transfer to chair and feet elevated. Pt also instructed with therapeutic exercises and demonstrates decent understanding. Pt may require ST IP rehab due to d/c concerns. Pt is not safe to be physically assisting  at this time.  -EL     Row Name 11/10/21 1657          Therapy Assessment/Plan (PT)    Rehab Potential (PT) good, to achieve stated therapy goals  -EL     Criteria for Skilled Interventions Met (PT) yes  -EL     Predicted Duration of Therapy Intervention (PT) Until d/c  -EL     Row Name 11/10/21 1657          Positioning and Restraints    Pre-Treatment Position in bed  -EL     Post Treatment Position chair  -EL     In Chair notified nsg; reclined; encouraged to call for assist; exit alarm on; with family/caregiver; call light within reach  -EL           User Key  (r) = Recorded By, (t) = Taken By, (c) = Cosigned By    Initials Name Provider Type    Robb Haines, PT Physical Therapist               Outcome Measures     Row Name 11/10/21 1706          How much help from another person  do you currently need...    Turning from your back to your side while in flat bed without using bedrails? 3  -EL     Moving from lying on back to sitting on the side of a flat bed without bedrails? 3  -EL     Moving to and from a bed to a chair (including a wheelchair)? 3  -EL     Standing up from a chair using your arms (e.g., wheelchair, bedside chair)? 3  -EL     Climbing 3-5 steps with a railing? 1  -EL     To walk in hospital room? 2  -EL     AM-PAC 6 Clicks Score (PT) 15  -EL     Row Name 11/10/21 1706          Functional Assessment    Outcome Measure Options AM-PAC 6 Clicks Basic Mobility (PT)  -EL           User Key  (r) = Recorded By, (t) = Taken By, (c) = Cosigned By    Initials Name Provider Type    Robb Haines, PT Physical Therapist                             Physical Therapy Education                 Title: PT OT SLP Therapies (Done)     Topic: Physical Therapy (Done)     Point: Mobility training (Done)     Learning Progress Summary           Patient Acceptance, E,TB, VU by  at 11/10/2021 1706                   Point: Precautions (Done)     Learning Progress Summary           Patient Acceptance, E,TB, VU by  at 11/10/2021 1706                               User Key     Initials Effective Dates Name Provider Type Discipline     06/23/20 -  Robb Carpenter, PT Physical Therapist PT              PT Recommendation and Plan  Planned Therapy Interventions (PT): balance training, neuromuscular re-education, bed mobility training, transfer training, gait training, patient/family education, strengthening  Plan of Care Reviewed With: patient, son  Outcome Summary: Pt is a 65 YO F s/p R ELIZABETH. Pt reports living at home with spouse, who she physically cares for. Pt typically very independent, but expressess concerns that she will have to physically care for  before she is ready.  is currently staying with family while pt is admitted. Pt this date requires MIN A for mobility with RWx but became  hypotensive with mobiltiy. Pt assisted with transfer to chair and feet elevated. Pt also instructed with therapeutic exercises and demonstrates decent understanding. Pt may require ST IP rehab due to d/c concerns. Pt is not safe to be physically assisting  at this time.     Time Calculation:    PT Charges     Row Name 11/10/21 1707             Time Calculation    Start Time 1537  -EL      Stop Time 1614  -EL      Time Calculation (min) 37 min  -EL      PT Received On 11/10/21  -EL      PT - Next Appointment 11/12/21  -EL      PT Goal Re-Cert Due Date 11/24/21  -EL              Time Calculation- PT    Total Timed Code Minutes- PT 10 minute(s)  -EL            User Key  (r) = Recorded By, (t) = Taken By, (c) = Cosigned By    Initials Name Provider Type    EL Robb Carpenter PT Physical Therapist              Therapy Charges for Today     Code Description Service Date Service Provider Modifiers Qty    63272304356 HC PT EVAL MOD COMPLEXITY 4 11/10/2021 Robb Carpenter, PT GP 1    98717736054 HC PT THER PROC EA 15 MIN 11/10/2021 Robb Carpenter, PT GP 1          PT G-Codes  Outcome Measure Options: AM-PAC 6 Clicks Basic Mobility (PT)  AM-PAC 6 Clicks Score (PT): 15    Robb Carpenter PT  11/10/2021

## 2021-11-10 NOTE — PLAN OF CARE
Goal Outcome Evaluation:   Pt admitted to [facility] from PACU. She is a pleasant, 66 year old patient who is AAOx4. Up w/2 assist w/a walker. VSS. Pain controlled w/PO roxicodone. Will continue to monitor.

## 2021-11-10 NOTE — ANESTHESIA PROCEDURE NOTES
Airway  Urgency: elective    Date/Time: 11/10/2021 10:06 AM    General Information and Staff    Patient location during procedure: OR  Anesthesiologist: Ish Bay MD  CRNA: Jolie Mclaughlin CAA    Indications and Patient Condition  Indications for airway management: airway protection    Preoxygenated: yes  Mask difficulty assessment: 2 - vent by mask + OA or adjuvant +/- NMBA    Final Airway Details  Final airway type: endotracheal airway      Successful airway: ETT  Cuffed: yes   Successful intubation technique: direct laryngoscopy  Facilitating devices/methods: intubating stylet and anterior pressure/BURP  Endotracheal tube insertion site: oral  Blade: [place]  Blade size: 3  ETT size (mm): 7.0  Cormack-Lehane Classification: grade I - full view of glottis  Placement verified by: chest auscultation and capnometry   Measured from: lips  ETT/EBT  to lips (cm): 20  Number of attempts at approach: 1  Assessment: lips, teeth, and gum same as pre-op and atraumatic intubation

## 2021-11-10 NOTE — PLAN OF CARE
Goal Outcome Evaluation:  Plan of Care Reviewed With: patient, son           Outcome Summary: Pt is a 65 YO F s/p R ELIZABETH. Pt reports living at home with spouse, who she physically cares for. Pt typically very independent, but expressess concerns that she will have to physically care for  before she is ready.  is currently staying with family while pt is admitted. Pt this date requires MIN A for mobility with RWx but became hypotensive with mobiltiy. Pt assisted with transfer to chair and feet elevated. Pt also instructed with therapeutic exercises and demonstrates decent understanding. Pt may require ST IP rehab due to d/c concerns. Pt is not safe to be physically assisting  at this time.

## 2021-11-10 NOTE — OP NOTE
TOTAL HIP ARTHROPLASTY ANTERIOR  Procedure Report    Patient Name:  Devika Jones  YOB: 1955    Date of Surgery:  11/10/2021     Indications: Pain in left hip with fracture    Pre-op Diagnosis: Degenerative arthritis right hip       Postoperative diagnosis same    Procedure/CPT® Codes:      Procedure(s):  TOTAL HIP ARTHROPLASTY ANTERIOR    Staff:  Surgeon(s):  Maximus Red MD    Assistant: Aura Sparks RN  The need for an assistant was necessary for hemostasis, proper retraction, and proper implantation of orthopedic devices.  Anesthesia: General    Estimated Blood Loss: <500ml      Findings: Degenerative arthritis of right hip    Complications: None    Description of Procedure: The patient was taken to the operating room and given general anesthesia and placed in the supine position on the [place] table.  The right lower extremity was prepped and draped in usual sterile fashion.  Usual anterior incision was made and skin was dissected down to soft tissue and neurovascular structures were retracted out of harm's way.  The fascia overlying the tensor fascia fredi was lifted. The tensor fascia fredi was tracked posteriorly. The fat was elevated off the capsule and the lateral femoral circumflex vessels were coagulated.  After this was done the capsulectomy was performed and the head and neck were removed.    Attention was directed to the acetabulum and it was reamed in and a size 54 Tripp cup was put in place.  A neutral all polyliner was then inserted.    At this time attention was directed to the femoral shaft and after dropping the leg and externally rotated to approximately 130 degrees and adducting the hip capsulectomy was performed and the femoral shaft was reamed and broached to a size 5 HA-coated press-fit stem along with a -3.5 neck 36 ceramic head.  Hip was reduced and had excellent stability in all ranges of motion and x-rays disclosed equal leg lengths.    Following this the wound was  washed out with copious amounts of sterile saline. The fascia and subcutaneous were closed with interrupted sutures of #1 and 0 Vicryl and the skin was closed with staples.    Wound was then covered with sterile gauze and ABD.    Needle and sponge counts were correct at the end of the procedure. The patient tolerated procedure well and was taken to the recovery room in satisfactory condition.    Maximus Red MD     Date: 11/10/2021  Time: 11:49 EST

## 2021-11-10 NOTE — CONSULTS
[facility] Medicine Services - Consult Note    Patient Name: Devika Jones  : 1955  MRN: [id number]  Primary Care Physician:  Nallely Jessica APRN  Referring Physician: Maximus Red MD  Date of admission: 11/10/2021    Inpatient Hospitalist Consult  Consult performed by: Manuel Russell MD  Consult ordered by: Maximus Red MD  Reason for consult: Hyperlipidemia, osteoarthritis, anxiety disorder  Assessment/Recommendations: Continue home regimen patient stable  Thank you for the consult  Shall follow closely          Subjective      Reason for Consult/ Chief Complaint:  Medical management / right hip pain      History of Present Illness: Devika Jones is a 66 y.o. female with a history of osteoarthritis and right hip pain who presented to [facility] on 11/10/2021 and underwent a right anterior total hip arthroplasty by Dr. Red. The patient was admitted to the Surgical Inpatient Unit postoperatively and the Hospitalist group was consulted for medical management.     The patient is alert and sitting up in a chair. She reports mild right hip pain. She denies any other complaints. Her son and daughter-in-law are at the bedside.       Review of Systems   Musculoskeletal: Positive for arthritis and joint pain.        Right hip   All other systems reviewed and are negative.       Personal History     Past Medical History:   Diagnosis Date   • ADD (attention deficit disorder)    • Allergies    • Arthritis    • Samuel's esophagus    • Cellulitis     post hysterectomy   • Cervical stenosis of spine     congenital   • Delayed emergence from anesthesia    • Depression    • Diverticular disease mild   • Frequent headaches    • GERD (gastroesophageal reflux disease)    • Hx of migraines    • Hypothyroid    • Insomnia    • Osteopenia    • Peripheral edema    • PONV (postoperative nausea and vomiting)    • Sleep apnea     cpap not using due to recall       Past Surgical  History:   Procedure Laterality Date   • COLONOSCOPY      multiple   • ENDOSCOPY      dilatation yearly   • HYSTERECTOMY  2003    cellulitis   • INCISION AND DRAINAGE BREAST ABSCESS Left 1960   • LUMBAR DISCECTOMY  1990    L4   • TONSILLECTOMY  1968   • VAGINAL DELIVERY      post delivery bleeding       Family History: family history is not on file. Otherwise pertinent FHx was reviewed and not pertinent to current issue.    Social History:  reports that she has never smoked. She has never used smokeless tobacco. She reports current alcohol use. She reports that she does not use drugs.    Home Medications:   ascorbic acid, citalopram, dicyclomine, levothyroxine, loratadine, multivitamin with minerals, and zolpidem    Allergies:  Allergies   Allergen Reactions   • Sulfa Antibiotics Shortness Of Breath     hives         Objective      Vitals:  Temp:  [97.4 °F (36.3 °C)-97.9 °F (36.6 °C)] 97.4 °F (36.3 °C)  Heart Rate:  [77-85] 81  Resp:  [10-16] 16  BP: (110-139)/(54-83) 128/71  Flow (L/min):  [2-10] 2    Physical Exam  Vitals reviewed.   HENT:      Head: Normocephalic.   Eyes:      Extraocular Movements: Extraocular movements intact.      Conjunctiva/sclera: Conjunctivae normal.      Pupils: Pupils are equal, round, and reactive to light.   Cardiovascular:      Rate and Rhythm: Normal rate and regular rhythm.      Pulses: Normal pulses.      Heart sounds: Normal heart sounds.   Pulmonary:      Effort: Pulmonary effort is normal.      Breath sounds: Normal breath sounds.   Abdominal:      General: Bowel sounds are normal. There is no distension.      Palpations: Abdomen is soft.      Tenderness: There is no abdominal tenderness.   Musculoskeletal:         General: Normal range of motion.      Cervical back: Normal range of motion.      Right lower leg: No edema.      Left lower leg: No edema.   Skin:     General: Skin is warm and dry.      Capillary Refill: Capillary refill takes less than 2 seconds.      Comments:  Right hip surgical incision covered with dry dressing   Neurological:      General: No focal deficit present.      Mental Status: She is alert and oriented to person, place, and time.   Psychiatric:         Mood and Affect: Mood normal.         Behavior: Behavior normal.          Result Review    Result Review:  I have personally reviewed the results from the time of this admission to 11/10/2021 16:38 EST and agree with these findings:  [x]  Laboratory  [x]  Microbiology  [x]  Radiology  []  EKG/Telemetry   []  Cardiology/Vascular   []  Pathology  [x]  Old records  []  Other:      Assessment/Plan        Active Hospital Problems:  Active Hospital Problems    Diagnosis    • **Osteoarthritis of right hip    • Anxiety disorder    • Hypothyroidism    • Insomnia    • Irritable bowel syndrome    • Overweight (BMI 25.0-29.9)    • Depressive disorder      Plan:     Osteoarthritis of right hip  -status post right anterior total hip arthroplasty (11/10/21)  -post-op care and PRN analgesia per orthopedic surgery  -PT/OT to eval and treat  -fall precautions     Anxiety disorder / Depressive disorder  -continue citalopram     Hypothyroidism  -continue levothyroxine     Irritable bowel syndrome  -continue Bentyl     Insomnia  -continue PRN Ambien (INSPECT reviewed)      This patient has been examined wearing appropriate Personal Protective Equipment. 11/10/21      Signature: Electronically signed by TED Burgos, 11/10/21, 4:59 PM EST.      Agree with above mentioned except my evaluation, assessment, plan and treatment supercedes that of ARNDEREK or PA.        Electronically signed by Manuel Russell MD, 11/10/21, 6:59 PM EST.                Devika Jones is a 66 y.o. female with a history of osteoarthritis and right hip pain who presented to [facility] on 11/10/2021 and underwent a right anterior total hip arthroplasty by Dr. Red. The patient was admitted to the Surgical Inpatient Unit postoperatively and the  Hospitalist group was consulted for medical management.      The patient is alert and sitting up in a chair. She reports mild right hip pain. She denies any other complaints. Her son and daughter-in-law are at the bedside.      Fairly healthy lady with no underlying issues.  Non-smoker.  Currently status postop right THR    ROS:  All systems negative except for above-mentioned    Physical Exam     Constitutional: Patient appears well-developed and well-nourished and in no acute distress     HEENT:   Head: Normocephalic and atraumatic.   Eyes:  Pupils are equal, round, and reactive to light. EOM are intact. Sclera are anicteric and non-injected.  Mouth and Throat: Patient has moist mucous membranes. Oropharynx is clear of any erythema or exudate.       Neck: Neck supple.  No thyromegaly present. No lymphadenopathy present. No  masses.     Cardiovascular: Inspection: No JVD present. Palpation: No parasternal heave. Pedal pulses +1 bilaterally. No leg edema. Auscultation: Regular rate, regular rhythm, S1 normal and S2 normal. reveals no gallop and no friction rub. No Carotid bruit bilaterally.    Pulmonary/Chest: Inspection: No distress, no use of accessory muscles. Lungs are clear to auscultation bilaterally. No respiratory distress. No wheezes. No rales.     Abdomen /Gastrointestinal: Inspection: no distension. Palpation: no masses, no organomegaly. Soft. There is no tenderness. Bowel sounds are normal.     Musculoskeletal: Normal Muscle tone. Age appropriate, no deformities.  Right hip postop.  Appropriate tenderness.    Neurological: Patient is alert and oriented to person, place, and time. Cranial nerves II-XII are grossly intact with no focal deficits. Sensori-motor exam is normal. No cerebellar signs.    Skin: Skin is warm. No rash noted. Nails show no clubbing.  No cyanosis or erythema. No bruising.    Emotional Behavior:   Appropriate   Debilities:  Age appropriate      Active Hospital Problems    Diagnosis   POA   • **Osteoarthritis of right hip [M16.11]  Yes     Priority: High   • Osteoporosis [M81.0]  Yes     Priority: Medium   • Mixed hyperlipidemia [E78.2]  Yes     Priority: Medium   • Anxiety disorder [F41.9]  Yes   • Hypothyroidism [E03.9]  Yes   • Insomnia [G47.00]  Yes   • Irritable bowel syndrome [K58.9]  Yes   • Overweight (BMI 25.0-29.9) [E66.3]  Yes   • Depressive disorder [F32.9]  Yes      Resolved Hospital Problems   No resolved problems to display.     Continue the home regimen for hyperlipidemia, hypothyroidism, generalized anxiety disorder.  We shall follow closely  I have no changes to suggest at the moment  Thank you for the consult

## 2021-11-10 NOTE — H&P
Psychiatric   HISTORY AND PHYSICAL    Patient Name: Devika Jones  : 1955  MRN: [id number]  Primary Care Physician:  Nallely Jessica APRN  Date of admission: 11/10/2021    Subjective   Subjective     Chief Complaint: Pain in right hip    History of Present Illness  This patient is a 66-year-old female with pain in her right hip.  She has been tried on a trial of nonoperative therapy.  It was unsuccessful.  As result she was admitted to the hospital for right total hip replacement.  Review of Systems   Negative  Personal History     Past Medical History:   Diagnosis Date   • ADD (attention deficit disorder)    • Allergies    • Arthritis    • Samuel's esophagus    • Cellulitis     post hysterectomy   • Cervical stenosis of spine     congenital   • Delayed emergence from anesthesia    • Depression    • Diverticular disease mild   • Frequent headaches    • GERD (gastroesophageal reflux disease)    • Hx of migraines    • Hypothyroid    • Insomnia    • Osteopenia    • Peripheral edema    • PONV (postoperative nausea and vomiting)    • Sleep apnea     cpap not using due to recall       Past Surgical History:   Procedure Laterality Date   • COLONOSCOPY      multiple   • ENDOSCOPY      dilatation yearly   • HYSTERECTOMY      cellulitis   • INCISION AND DRAINAGE BREAST ABSCESS Left    • LUMBAR DISCECTOMY      L4   • TONSILLECTOMY     • VAGINAL DELIVERY      post delivery bleeding       Family History: family history is not on file. Otherwise pertinent FHx was reviewed and not pertinent to current issue.    Social History:  reports that she has never smoked. She has never used smokeless tobacco. She reports current alcohol use. She reports that she does not use drugs.    Home Medications:  Calcium Carb-Cholecalciferol, Misc Natural Products, acetaminophen-codeine, amphetamine-dextroamphetamine, ascorbic acid, buPROPion, citalopram, dicyclomine, fluticasone, ibuprofen, levothyroxine,  loratadine, multivitamin with minerals, naproxen sodium, omeprazole, pseudoephedrine, spironolactone, and zolpidem    Allergies:  Allergies   Allergen Reactions   • Sulfa Antibiotics Shortness Of Breath     hives       Objective    Objective     Vitals:   Temp:  [97.5 °F (36.4 °C)] 97.5 °F (36.4 °C)  Heart Rate:  [85] 85  Resp:  [15] 15  BP: (136)/(64) 136/64    Physical Exam    Result Review    Result Review:  I have personally reviewed the results from the time of this admission to 11/10/2021 09:25 EST and agree with these findings:  []  Laboratory  []  Microbiology  []  Radiology  []  EKG/Telemetry   []  Cardiology/Vascular   []  Pathology  []  Old records  []  Other:  Most notable findings include: Oriented to time place and person  Head normocephalic and atraumatic  Eyes pupils equal round reactive to light and accommodation  Neck supple without adenopathy  Cardiovascular regular rate rhythm normal S1-S2  Lungs clear to auscultation percussion  Abdomen soft nontender nondistended bowel sounds within normal's no mass or fluid wave  Extremities no cyanosis clubbing or edema deep tendon reflexes within normal limits  Assessment/Plan   Assessment / Plan     Brief Patient Summary:  Devika Jones is a 66 y.o. female who has pain in her right hip secondary to osteoarthritis    Active Hospital Problems:  Active Hospital Problems    Diagnosis    • Osteoarthritis of right hip      Plan:   Right total hip replacement and postoperative physical therapy.    DVT prophylaxis:  No DVT prophylaxis order currently exists.    CODE STATUS:       Admission Status:  I believe this patient meets observation status.    Electronically signed by Maximus Red MD, 11/10/21, 9:25 AM EST.

## 2021-11-10 NOTE — ANESTHESIA PREPROCEDURE EVALUATION
Anesthesia Evaluation     Patient summary reviewed and Nursing notes reviewed   history of anesthetic complications: PONV prolonged sedation  NPO Solid Status: > 8 hours  NPO Liquid Status: > 8 hours           Airway   Mallampati: I  TM distance: >3 FB  Neck ROM: full  No difficulty expected  Dental - normal exam     Pulmonary - normal exam   (+) sleep apnea,   Cardiovascular - negative cardio ROS and normal exam        Neuro/Psych  (+) headaches, psychiatric history Depression,     GI/Hepatic/Renal/Endo    (+)  GERD,      Musculoskeletal     Abdominal  - normal exam    Bowel sounds: normal.   Substance History - negative use     OB/GYN negative ob/gyn ROS         Other   arthritis,                      Anesthesia Plan    ASA 2     general with block     intravenous induction     Anesthetic plan, all risks, benefits, and alternatives have been provided, discussed and informed consent has been obtained with: patient.    Plan discussed with CAA.

## 2021-11-10 NOTE — ANESTHESIA POSTPROCEDURE EVALUATION
Patient: Devika Jones    Procedure Summary     Date: 11/10/21 Room / Location: [facility] OR [address] [place] [address] MAIN OR    Anesthesia Start: 0954 Anesthesia Stop: 1150    Procedure: TOTAL HIP ARTHROPLASTY ANTERIOR (Right Hip) Diagnosis:       Degenerative joint disease of right hip      Right femoral fracture (HCC)      (FRACTURE OF RIGHT FEMORAL HEAD, DEGERATIVE JOINT DISEASE RIGHT HIP)    Surgeons: Maximus Red MD Provider: Ish Bay MD    Anesthesia Type: general with block ASA Status: 2          Anesthesia Type: general with block    Vitals  Vitals Value Taken Time   /61 11/10/21 1445   Temp 97.9 °F (36.6 °C) 11/10/21 1445   Pulse 80 11/10/21 1445   Resp 14 11/10/21 1445   SpO2 96 % 11/10/21 1445           Post Anesthesia Care and Evaluation    Patient location during evaluation: PACU  Patient participation: complete - patient participated  Level of consciousness: awake  Pain scale: See nurse's notes for pain score.  Pain management: adequate  Airway patency: patent  Anesthetic complications: No anesthetic complications  PONV Status: none  Cardiovascular status: acceptable  Respiratory status: acceptable  Hydration status: acceptable    Comments: Patient seen and examined postoperatively; vital signs stable; SpO2 greater than or equal to 90%; cardiopulmonary status stable; nausea/vomiting adequately controlled; pain adequately controlled; no apparent anesthesia complications; patient discharged from anesthesia care when discharge criteria were met

## 2021-11-11 LAB
ANION GAP SERPL CALCULATED.3IONS-SCNC: 9 MMOL/L (ref 5–15)
BUN SERPL-MCNC: 12 MG/DL (ref 8–23)
BUN/CREAT SERPL: 25.5 (ref 7–25)
CALCIUM SPEC-SCNC: 8 MG/DL (ref 8.6–10.5)
CHLORIDE SERPL-SCNC: 100 MMOL/L (ref 98–107)
CO2 SERPL-SCNC: 25 MMOL/L (ref 22–29)
CREAT SERPL-MCNC: 0.47 MG/DL (ref 0.57–1)
GFR SERPL CREATININE-BSD FRML MDRD: 132 ML/MIN/1.73
GLUCOSE SERPL-MCNC: 112 MG/DL (ref 65–99)
HCT VFR BLD AUTO: 30.3 % (ref 34–46.6)
HGB BLD-MCNC: 10.3 G/DL (ref 12–15.9)
POTASSIUM SERPL-SCNC: 4.1 MMOL/L (ref 3.5–5.2)
SODIUM SERPL-SCNC: 134 MMOL/L (ref 136–145)

## 2021-11-11 PROCEDURE — 97530 THERAPEUTIC ACTIVITIES: CPT

## 2021-11-11 PROCEDURE — 97110 THERAPEUTIC EXERCISES: CPT

## 2021-11-11 PROCEDURE — 97116 GAIT TRAINING THERAPY: CPT

## 2021-11-11 PROCEDURE — 25010000002 CEFAZOLIN PER 500 MG: Performed by: ORTHOPAEDIC SURGERY

## 2021-11-11 PROCEDURE — 25010000002 ONDANSETRON PER 1 MG: Performed by: ORTHOPAEDIC SURGERY

## 2021-11-11 PROCEDURE — 85014 HEMATOCRIT: CPT | Performed by: ORTHOPAEDIC SURGERY

## 2021-11-11 PROCEDURE — 25010000002 HYDROMORPHONE PER 4 MG: Performed by: ORTHOPAEDIC SURGERY

## 2021-11-11 PROCEDURE — 80048 BASIC METABOLIC PNL TOTAL CA: CPT | Performed by: ORTHOPAEDIC SURGERY

## 2021-11-11 PROCEDURE — 94799 UNLISTED PULMONARY SVC/PX: CPT

## 2021-11-11 PROCEDURE — 85018 HEMOGLOBIN: CPT | Performed by: ORTHOPAEDIC SURGERY

## 2021-11-11 PROCEDURE — 97165 OT EVAL LOW COMPLEX 30 MIN: CPT

## 2021-11-11 RX ORDER — TEMAZEPAM 15 MG/1
15 CAPSULE ORAL NIGHTLY PRN
Status: CANCELLED | OUTPATIENT
Start: 2021-11-11

## 2021-11-11 RX ORDER — TEMAZEPAM 15 MG/1
15 CAPSULE ORAL NIGHTLY PRN
COMMUNITY

## 2021-11-11 RX ORDER — ACETAMINOPHEN AND CODEINE PHOSPHATE 300; 30 MG/1; MG/1
1 TABLET ORAL EVERY 6 HOURS PRN
COMMUNITY

## 2021-11-11 RX ADMIN — MULTIPLE VITAMINS W/ MINERALS TAB 1 TABLET: TAB at 08:18

## 2021-11-11 RX ADMIN — OXYCODONE 10 MG: 5 TABLET ORAL at 00:59

## 2021-11-11 RX ADMIN — OXYCODONE 10 MG: 5 TABLET ORAL at 15:40

## 2021-11-11 RX ADMIN — DICYCLOMINE HYDROCHLORIDE 10 MG: 10 CAPSULE ORAL at 08:18

## 2021-11-11 RX ADMIN — OXYCODONE 10 MG: 5 TABLET ORAL at 21:30

## 2021-11-11 RX ADMIN — HYDROCODONE BITARTRATE AND ACETAMINOPHEN 1 TABLET: 5; 325 TABLET ORAL at 08:18

## 2021-11-11 RX ADMIN — CITALOPRAM HYDROBROMIDE 20 MG: 20 TABLET ORAL at 21:31

## 2021-11-11 RX ADMIN — CETIRIZINE HYDROCHLORIDE 10 MG: 10 TABLET, FILM COATED ORAL at 08:18

## 2021-11-11 RX ADMIN — SODIUM CHLORIDE, POTASSIUM CHLORIDE, SODIUM LACTATE AND CALCIUM CHLORIDE 75 ML/HR: 600; 310; 30; 20 INJECTION, SOLUTION INTRAVENOUS at 05:48

## 2021-11-11 RX ADMIN — CEFAZOLIN SODIUM 2 G: 10 INJECTION, POWDER, FOR SOLUTION INTRAVENOUS at 01:00

## 2021-11-11 RX ADMIN — OXYCODONE HYDROCHLORIDE AND ACETAMINOPHEN 500 MG: 500 TABLET ORAL at 08:18

## 2021-11-11 RX ADMIN — DICYCLOMINE HYDROCHLORIDE 10 MG: 10 CAPSULE ORAL at 21:30

## 2021-11-11 RX ADMIN — DICYCLOMINE HYDROCHLORIDE 10 MG: 10 CAPSULE ORAL at 15:40

## 2021-11-11 RX ADMIN — HYDROMORPHONE HYDROCHLORIDE 0.5 MG: 2 INJECTION, SOLUTION INTRAMUSCULAR; INTRAVENOUS; SUBCUTANEOUS at 00:25

## 2021-11-11 RX ADMIN — ONDANSETRON 4 MG: 2 INJECTION INTRAMUSCULAR; INTRAVENOUS at 08:58

## 2021-11-11 RX ADMIN — FAMOTIDINE 40 MG: 20 TABLET ORAL at 08:18

## 2021-11-11 RX ADMIN — OXYCODONE 10 MG: 5 TABLET ORAL at 05:47

## 2021-11-11 RX ADMIN — LEVOTHYROXINE SODIUM 75 MCG: 0.07 TABLET ORAL at 08:18

## 2021-11-11 NOTE — THERAPY TREATMENT NOTE
Subjective: Pt agreeable to therapeutic plan of care.    Objective:     Bed mobility - {Assist Levels:15678}  Transfers - {Assist Levels:91402}  Ambulation - *** feet {Assist Levels:81646}    Pain: {0-10:01933} VAS  Education: Provided education on importance of mobility and skilled verbal / tactile cueing throughout intervention.     Assessment: Devika Jones presents with functional mobility impairments which indicate the need for skilled intervention. Tolerating session today without incident. Will continue to follow and progress as tolerated.     Plan/Recommendations:   Pt would benefit from {Discharge Recommendations:56102} at discharge from facility and requires {DME:41108} at discharge.   Pt desires {Discharge Recommendations:57077} at discharge. Pt cooperative; agreeable to therapeutic recommendations and plan of care.     Basic Mobility 6-click:  Rollin = Total, A lot = 2, A little = 3; 4 = None  Supine>Sit:   1 = Total, A lot = 2, A little = 3; 4 = None   Sit>Stand with arms:  1 = Total, A lot = 2, A little = 3; 4 = None  Bed>Chair:   1 = Total, A lot = 2, A little = 3; 4 = None  Ambulate in room:  1 = Total, A lot = 2, A little = 3; 4 = None  3-5 Steps with railin = Total, A lot = 2, A little = 3; 4 = None  Score: {6-Click:94818}    Modified [place]: {Modified [place] Score:46456}    Post-Tx Position: {Post-Tx Position:41377}  PPE: gloves, surgical mask, eyewear protection

## 2021-11-11 NOTE — PLAN OF CARE
Devika Jones presents with functional mobility impairments which indicate the need for skilled intervention. Tolerating session today without incident. Pt with symptomatic orthostatic hypotensive response to standing resulting in inability to assess gait for pt safety.  Pt reports lightheadedness subsided with return to chair.  Pt requiring active assist for right hip therex due to weakness.  Pt far from functional mobility baseline and not safe to return home.  Pt will need extensive IP rehab.  Will continue to follow and progress as tolerated.     Plan/Recommendations:   Pt would benefit from Inpatient Rehabilitation placement at discharge from facility and requires no DME at discharge.   Pt desires Inpatient Rehabilitation placement at discharge. Pt cooperative; agreeable to therapeutic recommendations and plan of care.

## 2021-11-11 NOTE — PROGRESS NOTES
"     LOS: 0 days   Patient Care Team:  Nallely Jessica APRN as PCP - General (Nurse Practitioner)    Chief Complaint: Follow-up right hip    Subjective     Interval History:     History taken from: patient  Patient says that she hurts but not any more than she expected to.  She said she rested well last night.    Objective     Vital Signs  Visit Vitals  /76 (BP Location: Right arm, Patient Position: Lying)   Pulse 91   Temp 98.3 °F (36.8 °C) (Oral)   Resp 17   Ht 175.3 cm (69\")   Wt 101 kg (221 lb 9.6 oz)   SpO2 96%   BMI 32.72 kg/m²       Physical Exam:        General Appearance:   By her self.  Supine in bed.  Oriented to time place and person.   Extremities:  Legs are well aligned.  The dressing on her right hip is intact.   Pulses:  Deferred   Skin:  Other than the surgical incision the skin is intact.   Neurologic:  5/5 plantar flexion dorsiflexion.         Results Review:    CBC    Results from last 7 days   Lab Units 11/11/21  0339   HEMOGLOBIN g/dL 10.3*     BMP   Results from last 7 days   Lab Units 11/11/21  0339   SODIUM mmol/L 134*   POTASSIUM mmol/L 4.1   CHLORIDE mmol/L 100   CO2 mmol/L 25.0   BUN mg/dL 12   CREATININE mg/dL 0.47*   GLUCOSE mg/dL 112*     Infection     Radiology(recent) No radiology results for the last day    Imaging reviewed: No new orthopedic images taken since surgery yesterday.    Medication Review:   Scheduled Meds:ascorbic acid, 500 mg, Oral, Daily  cetirizine, 10 mg, Oral, Daily  citalopram, 20 mg, Oral, Nightly  dicyclomine, 10 mg, Oral, TID  [START ON 11/12/2021] enoxaparin, 30 mg, Subcutaneous, Daily  famotidine, 40 mg, Oral, Daily  levothyroxine, 75 mcg, Oral, Daily  multivitamin with minerals, 1 tablet, Oral, Daily      Continuous Infusions:lactated ringers, 75 mL/hr, Last Rate: Stopped (11/11/21 0619)      PRN Meds:.HYDROcodone-acetaminophen  •  HYDROmorphone **AND** naloxone  •  ondansetron **OR** ondansetron  •  oxyCODONE  •  oxyCODONE  •  " zolpidem    Assessment/Plan       Osteoarthritis of right hip    Anxiety disorder    Depressive disorder    Mixed hyperlipidemia    Hypothyroidism    Irritable bowel syndrome    Osteoporosis    Insomnia    Overweight (BMI 25.0-29.9)    Impression #1 postop day #1 for right anterior total hip replacement  Recommendations #1 physical therapy weight-bear as tolerated #2 she will probably need to go to rehab.        Maximus Red MD  11/11/21  07:20 EST

## 2021-11-11 NOTE — THERAPY TREATMENT NOTE
Subjective: Pt agreeable to therapeutic plan of care.    Objective:     Bed mobility - up in chair  Transfers - Min-A, Mod-A and with rolling walker  Ambulation - unable due to orthostatic hypotension     BP sitting up in chair 122/56, BP standing 84/45 with pt c/o nausea/lightheadedness, BP after return to sitting in chair 124/56    WB'ing status: R Lower Extremity Weight Bearing As Tolerated    Therapeutic Exercise: 10 Reps R Lower Extremity AAROM seated LAQ, hip flexion, hip abduction, heel slides, quad sets    Precautions: High falls risk and Anterior hip precautions    Pain: 7 VAS right hip   Education: Provided education on importance of mobility and skilled verbal / tactile cueing throughout intervention.     Assessment: Devika Jones presents with functional mobility impairments which indicate the need for skilled intervention. Tolerating session today without incident. Pt with symptomatic orthostatic hypotensive response to standing resulting in inability to assess gait for pt safety.  Pt reports lightheadedness subsided with return to chair.  Pt requiring active assist for right hip therex due to weakness.  Pt far from functional mobility baseline and not safe to return home.  Pt will need extensive IP rehab.  Will continue to follow and progress as tolerated.     Plan/Recommendations:   Pt would benefit from Inpatient Rehabilitation placement at discharge from facility and requires no DME at discharge.   Pt desires Inpatient Rehabilitation placement at discharge. Pt cooperative; agreeable to therapeutic recommendations and plan of care.     Basic Mobility 6-click:  Rollin = Total, A lot = 2, A little = 3; 4 = None  Supine>Sit:   1 = Total, A lot = 2, A little = 3; 4 = None   Sit>Stand with arms:  1 = Total, A lot = 2, A little = 3; 4 = None  Bed>Chair:   1 = Total, A lot = 2, A little = 3; 4 = None  Ambulate in room:  1 = Total, A lot = 2, A little = 3; 4 = None  3-5 Steps with railing:   1 = Total, A lot = 2, A little = 3; 4 = None  Score: 10    Post-Tx Position: Up in Chair, Alarms activated and Call light and personal items within reach  PPE: gloves, surgical mask, eyewear protection

## 2021-11-11 NOTE — THERAPY TREATMENT NOTE
Subjective: Pt agreeable to therapeutic plan of care.    Objective:     Bed mobility - up in chair  Transfers - Min-A and with rolling walker  Ambulation - 40 ft Min-A and with rolling walker     BP sitting up in chair 133/79, BP standing 126/72    WB'ing status: R Lower Extremity Weight Bearing As Tolerated    Therapeutic Exercise: 10 Reps R Lower Extremity AAROM seated LAQ, hip abduction, ankle pumps, heel slides, quad/glut sets    Precautions: High falls risk and Anterior hip precautions    Pain: 6 VAS right LE pain  Education: Provided education on importance of mobility and skilled verbal / tactile cueing throughout intervention.     Assessment: Devika Jones presents with functional mobility impairments which indicate the need for skilled intervention. Tolerating session today without incident. Pt with improved blood pressure this afternoon and able to ambulate short distance using RW.  Gait training provided on step length and use of RW.  Pt distracted easily and limited due to pain in right LE.  Pt far from baseline mobility of indep caring for spouse.  Pt will need extensive IP rehab to address deficits.  Will continue to follow and progress as tolerated.     Plan/Recommendations:   Pt would benefit from Inpatient Rehabilitation placement at discharge from facility and requires no DME at discharge.   Pt desires Inpatient Rehabilitation placement at discharge. Pt cooperative; agreeable to therapeutic recommendations and plan of care.     Basic Mobility 6-click:  Rollin = Total, A lot = 2, A little = 3; 4 = None  Supine>Sit:   1 = Total, A lot = 2, A little = 3; 4 = None   Sit>Stand with arms:  1 = Total, A lot = 2, A little = 3; 4 = None  Bed>Chair:   1 = Total, A lot = 2, A little = 3; 4 = None  Ambulate in room:  1 = Total, A lot = 2, A little = 3; 4 = None  3-5 Steps with railin = Total, A lot = 2, A little = 3; 4 = None  Score: 12    Post-Tx Position: Up in Chair, Alarms activated  and Call light and personal items within reach, son present   PPE: gloves, surgical mask, eyewear protection

## 2021-11-11 NOTE — DISCHARGE PLACEMENT REQUEST
"Devika Jones (66 y.o. Female)             Date of Birth Social Security Number Address Home Phone MRN    1955  [address] [place] [address] [phone] [id number]    Shinto Marital Status             Anabaptist        Admission Date Admission Type Admitting Provider Attending Provider Department, Room/Bed    11/10/21 Elective Maximus Red MD Bell, Edward E, MD [facility] SURGICAL INPATIENT, 4119/1    Discharge Date Discharge Disposition Discharge Destination                         Attending Provider: Maximus Red MD    Allergies: Sulfa Antibiotics    Isolation: None   Infection: None   Code Status: Not on file   Advance Care Planning Activity    Ht: 175.3 cm (69\")   Wt: 101 kg (221 lb 9.6 oz)    Admission Cmt: None   Principal Problem: Osteoarthritis of right hip [M16.11]                 Active Insurance as of 11/10/2021     Primary Coverage     Payor Plan Insurance Group Employer/Plan Group    MEDICARE MEDICARE A & B      Payor Plan Address Payor Plan Phone Number Payor Plan Fax Number Effective Dates    [address] [phone]  4/1/2008 - None Entered    [facility] [id number]       Subscriber Name Subscriber Birth Date Member ID       DEVIKA JONES 1955 [id number]           Secondary Coverage     Payor Plan Insurance Group Employer/Plan Group    [employer]      Payor Plan Address Payor Plan Phone Number Payor Plan Fax Number Effective Dates    [address] [phone]  7/12/2019 - None Entered    [place] [address]       Subscriber Name Subscriber Birth Date Member ID       DEVIKA JONES 1955 [id number]           Tertiary Coverage     Payor Plan Insurance Group Employer/Plan Group    [employer]      Payor Plan Address Payor Plan Phone Number Payor Plan Fax Number Effective Dates    [address] [address] [phone]  7/15/2019 - None Entered    " [place] [address]       Subscriber Name Subscriber Birth Date Member ID       DEVIKA JONES 1955 [id number]                 Emergency Contacts      (Rel.) Home Phone Work Phone Mobile Phone    DANTE JONES (Son) -- -- [phone]               History & Physical      Maximus Red MD at 11/10/21 0925          [facility]   HISTORY AND PHYSICAL    Patient Name: Devika Jones  : 1955  MRN: [id number]  Primary Care Physician:  Nallely Jessica APRN  Date of admission: 11/10/2021    Subjective   Subjective     Chief Complaint: Pain in right hip    History of Present Illness  This patient is a 66-year-old female with pain in her right hip.  She has been tried on a trial of nonoperative therapy.  It was unsuccessful.  As result she was admitted to the hospital for right total hip replacement.  Review of Systems   Negative  Personal History     Past Medical History:   Diagnosis Date   • ADD (attention deficit disorder)    • Allergies    • Arthritis    • Samuel's esophagus    • Cellulitis     post hysterectomy   • Cervical stenosis of spine     congenital   • Delayed emergence from anesthesia    • Depression    • Diverticular disease mild   • Frequent headaches    • GERD (gastroesophageal reflux disease)    • Hx of migraines    • Hypothyroid    • Insomnia    • Osteopenia    • Peripheral edema    • PONV (postoperative nausea and vomiting)    • Sleep apnea     cpap not using due to recall       Past Surgical History:   Procedure Laterality Date   • COLONOSCOPY      multiple   • ENDOSCOPY      dilatation yearly   • HYSTERECTOMY      cellulitis   • INCISION AND DRAINAGE BREAST ABSCESS Left    • LUMBAR DISCECTOMY      L4   • TONSILLECTOMY     • VAGINAL DELIVERY      post delivery bleeding       Family History: family history is not on file. Otherwise pertinent FHx was reviewed and not pertinent to current issue.    Social History:  reports that she has never  smoked. She has never used smokeless tobacco. She reports current alcohol use. She reports that she does not use drugs.    Home Medications:  Calcium Carb-Cholecalciferol, Misc Natural Products, acetaminophen-codeine, amphetamine-dextroamphetamine, ascorbic acid, buPROPion, citalopram, dicyclomine, fluticasone, ibuprofen, levothyroxine, loratadine, multivitamin with minerals, naproxen sodium, omeprazole, pseudoephedrine, spironolactone, and zolpidem    Allergies:  Allergies   Allergen Reactions   • Sulfa Antibiotics Shortness Of Breath     hives       Objective    Objective     Vitals:   Temp:  [97.5 °F (36.4 °C)] 97.5 °F (36.4 °C)  Heart Rate:  [85] 85  Resp:  [15] 15  BP: (136)/(64) 136/64    Physical Exam    Result Review    Result Review:  I have personally reviewed the results from the time of this admission to 11/10/2021 09:25 EST and agree with these findings:  []  Laboratory  []  Microbiology  []  Radiology  []  EKG/Telemetry   []  Cardiology/Vascular   []  Pathology  []  Old records  []  Other:  Most notable findings include: Oriented to time place and person  Head normocephalic and atraumatic  Eyes pupils equal round reactive to light and accommodation  Neck supple without adenopathy  Cardiovascular regular rate rhythm normal S1-S2  Lungs clear to auscultation percussion  Abdomen soft nontender nondistended bowel sounds within normal's no mass or fluid wave  Extremities no cyanosis clubbing or edema deep tendon reflexes within normal limits  Assessment/Plan   Assessment / Plan     Brief Patient Summary:  Devika Jones is a 66 y.o. female who has pain in her right hip secondary to osteoarthritis    Active Hospital Problems:  Active Hospital Problems    Diagnosis    • Osteoarthritis of right hip      Plan:   Right total hip replacement and postoperative physical therapy.    DVT prophylaxis:  No DVT prophylaxis order currently exists.    CODE STATUS:       Admission Status:  I believe this patient meets  observation status.    Electronically signed by Maximus Red MD, 11/10/21, 9:25 AM EST.    Electronically signed by Maximus Red MD at 11/10/21 0937          Physical Therapy Notes (most recent note)      Elen Gonzalez, PT at 11/11/21 1313  Version 1 of 1       Devika Jones presents with functional mobility impairments which indicate the need for skilled intervention. Tolerating session today without incident. Pt with symptomatic orthostatic hypotensive response to standing resulting in inability to assess gait for pt safety.  Pt reports lightheadedness subsided with return to chair.  Pt requiring active assist for right hip therex due to weakness.  Pt far from functional mobility baseline and not safe to return home.  Pt will need extensive IP rehab.  Will continue to follow and progress as tolerated.     Plan/Recommendations:   Pt would benefit from Inpatient Rehabilitation placement at discharge from facility and requires no DME at discharge.   Pt desires Inpatient Rehabilitation placement at discharge. Pt cooperative; agreeable to therapeutic recommendations and plan of care.     Electronically signed by Elen Gonzalez, PT at 11/11/21 1313       Occupational Therapy Notes (most recent note)    No notes exist for this encounter.

## 2021-11-11 NOTE — PLAN OF CARE
Devika Jones presents with functional mobility impairments which indicate the need for skilled intervention. Tolerating session today without incident. Pt with improved blood pressure this afternoon and able to ambulate short distance using RW.  Gait training provided on step length and use of RW.  Pt distracted easily and limited due to pain in right LE.  Pt far from baseline mobility of indep caring for spouse.  Pt will need extensive IP rehab to address deficits.  Will continue to follow and progress as tolerated.     Plan/Recommendations:   Pt would benefit from Inpatient Rehabilitation placement at discharge from facility and requires no DME at discharge.   Pt desires Inpatient Rehabilitation placement at discharge. Pt cooperative; agreeable to therapeutic recommendations and plan of care.

## 2021-11-11 NOTE — PLAN OF CARE
Goal Outcome Evaluation:  Plan of Care Reviewed With: patient        Progress: no change  Outcome Summary: Pt. is 67 y/o female admit s/o R ELIZABETH. Pt. lives at home w/ spouse whom requires physical assistance from patient for sit to stand transfers. Pt. reports increased pain this PM, completes SPS transfer EOB to armchair w/ min A for safety + rolling walker support. Pt. BP stable throughout w/o orthostatic hypotension. Pt. requires mod-max A for LB ADLs secondary to increased pain w/ decreased hip flexion. Recommend IP rehab at d/c to address aforementioned deficits, will follow up w/ pt. 1-3x per week at [facility].

## 2021-11-11 NOTE — PLAN OF CARE
Goal Outcome Evaluation:   Pt AAOx4. Ambulates w/1 assist w/walker to the bathroom. Bed alarm on. VSS - blood pressure lowers when she stands up. Pain controlled w/roxicodone. Will continue to monitor.

## 2021-11-11 NOTE — CASE MANAGEMENT/SOCIAL WORK
Discharge Planning Assessment   Horacio     Patient Name: Devika Jones  MRN: [id number]  Today's Date: 11/11/2021    Admit Date: 11/10/2021     Discharge Needs Assessment     Row Name 11/11/21 1710       Living Environment    Lives With spouse    Current Living Arrangements home/apartment/condo    Primary Care Provided by self    Provides Primary Care For spouse    Family Caregiver if Needed child(roselia), adult    Family Caregiver Names Son- Tam    Quality of Family Relationships helpful; involved; supportive    Able to Return to Prior Arrangements yes       Resource/Environmental Concerns    Resource/Environmental Concerns none       Transition Planning    Patient/Family Anticipates Transition to inpatient rehabilitation facility    Patient/Family Anticipated Services at Transition none    Transportation Anticipated family or friend will provide; health plan transportation       Discharge Needs Assessment    Readmission Within the Last 30 Days no previous admission in last 30 days    Equipment Currently Used at Home cpap    Concerns to be Addressed care coordination/care conferences; discharge planning    Anticipated Changes Related to Illness none    Equipment Needed After Discharge none    Discharge Facility/Level of Care Needs rehabilitation facility    Provided Post Acute Provider List? N/A               Discharge Plan     Row Name 11/11/21 1711       Plan    Plan DC Plan: Accepted to [facility] pending bed availability. No PASRR or precert needed.    Patient/Family in Agreement with Plan yes    Plan Comments CM met with patient and sonTam at bedside to discuss dc planning and LUTZ letter. Patient reported that her  was 78 years old and had dementia. Reported that his sisters can care for him because she was interested in going to rehab as recommended. CM provided IP rehab list. Patient reported that her mom was in [place]. After working with therapy, patient and son decided on Acute  rehab facilities ([facility] and [facility]). CM sent new referrals in basket and to liaisons. Received notification from [facility] liaison that patient does not meet acute criteria and there are no subacute beds available at this time. CM received notification from [facility] liaison that they can accept and bed available possibly tomorrow or Saturday. CM updated patient’s son, Tam who was agreeable. No specific pharmacy needed, [facility] to order off MAR.              Continued Care and Services - Admitted Since 11/10/2021     Destination Coordination complete.    Service Provider Request Status Selected Services Address Phone Fax Patient Preferred    [facility]   Selected Inpatient Rehabilitation [address] [facility] [address] [place] [id number] [phone]     [facility]  Pending - Request Sent N/A [address] [facility] [place] [address] [phone] --              Expected Discharge Date and Time     Expected Discharge Date Expected Discharge Time    Nov 12, 2021          Demographic Summary     Row Name 11/11/21 1709       General Information    Admission Type observation    Required Notices Provided Observation Status Notice    Referral Source admission list    Reason for Consult discharge planning    Preferred Language English     Used During This Interaction no       Contact Information    Permission Granted to Share Info With                Functional Status     Row Name 11/11/21 1710       Functional Status    Usual Activity Tolerance good    Current Activity Tolerance moderate       Functional Status, IADL    Medications independent    Meal Preparation independent    Housekeeping independent    Laundry independent    Shopping independent              Met with patient in room wearing PPE: mask/goggles.      Maintained distance greater than six feet and spent less than 15 minutes in the room.      Jesika Hernandez RN      Office Phone: [phone]  Office Cell: [phone]

## 2021-11-12 LAB
HCT VFR BLD AUTO: 30.8 % (ref 34–46.6)
HGB BLD-MCNC: 10.2 G/DL (ref 12–15.9)

## 2021-11-12 PROCEDURE — 25010000002 ENOXAPARIN PER 10 MG: Performed by: ORTHOPAEDIC SURGERY

## 2021-11-12 PROCEDURE — 97116 GAIT TRAINING THERAPY: CPT

## 2021-11-12 PROCEDURE — 85018 HEMOGLOBIN: CPT | Performed by: ORTHOPAEDIC SURGERY

## 2021-11-12 PROCEDURE — 97530 THERAPEUTIC ACTIVITIES: CPT

## 2021-11-12 PROCEDURE — 85014 HEMATOCRIT: CPT | Performed by: ORTHOPAEDIC SURGERY

## 2021-11-12 RX ADMIN — DICYCLOMINE HYDROCHLORIDE 10 MG: 10 CAPSULE ORAL at 16:11

## 2021-11-12 RX ADMIN — MULTIPLE VITAMINS W/ MINERALS TAB 1 TABLET: TAB at 09:31

## 2021-11-12 RX ADMIN — OXYCODONE 10 MG: 5 TABLET ORAL at 22:37

## 2021-11-12 RX ADMIN — OXYCODONE HYDROCHLORIDE AND ACETAMINOPHEN 500 MG: 500 TABLET ORAL at 09:31

## 2021-11-12 RX ADMIN — HYDROCODONE BITARTRATE AND ACETAMINOPHEN 1 TABLET: 5; 325 TABLET ORAL at 17:59

## 2021-11-12 RX ADMIN — ENOXAPARIN SODIUM 30 MG: 30 INJECTION SUBCUTANEOUS at 16:11

## 2021-11-12 RX ADMIN — LEVOTHYROXINE SODIUM 75 MCG: 0.07 TABLET ORAL at 05:22

## 2021-11-12 RX ADMIN — HYDROCODONE BITARTRATE AND ACETAMINOPHEN 1 TABLET: 5; 325 TABLET ORAL at 09:31

## 2021-11-12 RX ADMIN — CETIRIZINE HYDROCHLORIDE 10 MG: 10 TABLET, FILM COATED ORAL at 09:30

## 2021-11-12 RX ADMIN — DICYCLOMINE HYDROCHLORIDE 10 MG: 10 CAPSULE ORAL at 09:31

## 2021-11-12 RX ADMIN — DICYCLOMINE HYDROCHLORIDE 10 MG: 10 CAPSULE ORAL at 22:36

## 2021-11-12 RX ADMIN — CITALOPRAM HYDROBROMIDE 20 MG: 20 TABLET ORAL at 22:37

## 2021-11-12 NOTE — PLAN OF CARE
Goal Outcome Evaluation:         Assessment: Devika Jones presents with functional mobility impairments which indicate the need for skilled intervention. Pt showing improvement in gait training and transfer training this date requiring less assistance complete all tasks and able to amb further distance. Pt would benefit from inpatient rehab placement to increase mobility, mm strength and endurance before returning home to care for self. Tolerating session today without incident. Will continue to follow and progress as tolerated.

## 2021-11-12 NOTE — THERAPY TREATMENT NOTE
Subjective: Pt agreeable to therapeutic plan of care.    Objective:     Pt completed bed mobility tasks with supine to sit transfer with SBA.  Pt completed sit to stand transfer and stand pivot transfer with SBA.  Pt completed gait training 1x45ft with FWW and CGA.     Pain: 7 VAS in right hip with and without movement.  Education: Provided education on importance of mobility and skilled verbal / tactile cueing throughout intervention.     Assessment: Devika Jones presents with functional mobility impairments which indicate the need for skilled intervention. Pt showing improvement in gait training and transfer training this date requiring less assistance complete all tasks and able to amb further distance. Pt would benefit from inpatient rehab placement to increase mobility, mm strength and endurance before returning home to care for self. Tolerating session today without incident. Will continue to follow and progress as tolerated.     Plan/Recommendations:   Pt would benefit from Inpatient Rehabilitation placement at discharge from facility and requires rolling walker at discharge.   Pt desires Inpatient Rehabilitation placement at discharge. Pt cooperative; agreeable to therapeutic recommendations and plan of care.     Basic Mobility 6-click:  Rollin = Total, A lot = 2, A little = 3; 4 = None  Supine>Sit:   1 = Total, A lot = 2, A little = 3; 4 = None   Sit>Stand with arms:  1 = Total, A lot = 2, A little = 3; 4 = None  Bed>Chair:   1 = Total, A lot = 2, A little = 3; 4 = None  Ambulate in room:  1 = Total, A lot = 2, A little = 3; 4 = None  3-5 Steps with railin = Total, A lot = 2, A little = 3; 4 = None  Score: 22    Modified Martinez: N/A = No pre-op stroke/TIA    Post-Tx Position: Up in Chair with call light in reach.  PPE: gloves, surgical mask, eyewear protection

## 2021-11-12 NOTE — PLAN OF CARE
Goal Outcome Evaluation:         Assessment: Devika Jones presents with functional mobility impairments which indicate the need for skilled intervention. Pt able to increase amb distance this PM treatment to 50ft but still have mobility limitations due to pain in right hip and would benefit from inpatient rehab placement at this time to regain mm strength and endurance to return home independently and at [facility]. Tolerating session today without incident. Will continue to follow and progress as tolerated.

## 2021-11-12 NOTE — CASE MANAGEMENT/SOCIAL WORK
Continued Stay Note  KACEY Leonard     Patient Name: Devika Jones  MRN: [id number]  Today's Date: 11/12/2021    Admit Date: 11/10/2021     Discharge Plan     Row Name 11/12/21 1134       Plan    Plan DC Plan: Accepted to [facility], bed available 11/13, no PASRR or precert needed.    Plan Comments Discussed with [facility] liaison that bed would be available for patient tomorrow, 11/13. RN and MD updated via secure chat.              Phone communication or documentation only - no physical contact with patient or family.    Jesika Hernandez RN     Office Phone: [phone]  Office Cell: [phone]

## 2021-11-12 NOTE — THERAPY TREATMENT NOTE
Subjective: Pt agreeable to therapeutic plan of care.    Objective:     Pt completed sit to stand transfer and stand pivot transfer with SBA.  Pt completed gait training 1x50ft with FWW and CGA.    WB'ing status: R Lower Extremity Weight Bearing As Tolerated    Therapeutic Exercise: 10 Reps Bilaterally AROM Total Hip: Ankle Pumps, Glut Sets, Heel slides <90 degrees, Hip Abduction, Mini-Squat    Precautions: Anterior hip precautions    Pain: 0 VAS  Education: Provided education on importance of mobility and skilled verbal / tactile cueing throughout intervention.     Assessment: Devika Jones presents with functional mobility impairments which indicate the need for skilled intervention. Pt able to increase amb distance this PM treatment to 50ft but still have mobility limitations due to pain in right hip and would benefit from inpatient rehab placement at this time to regain mm strength and endurance to return home independently and at [facility]. Tolerating session today without incident. Will continue to follow and progress as tolerated.     Plan/Recommendations:   Pt would benefit from Inpatient Rehabilitation placement at discharge from facility and requires rolling walker at discharge.   Pt desires Inpatient Rehabilitation placement at discharge. Pt cooperative; agreeable to therapeutic recommendations and plan of care.     Basic Mobility 6-click:  Rollin = Total, A lot = 2, A little = 3; 4 = None  Supine>Sit:   1 = Total, A lot = 2, A little = 3; 4 = None   Sit>Stand with arms:  1 = Total, A lot = 2, A little = 3; 4 = None  Bed>Chair:   1 = Total, A lot = 2, A little = 3; 4 = None  Ambulate in room:  1 = Total, A lot = 2, A little = 3; 4 = None  3-5 Steps with railin = Total, A lot = 2, A little = 3; 4 = None  Score: 22    Modified Martinez: N/A = No pre-op stroke/TIA    Post-Tx Position: Up in Chair with call light in reach.  PPE: gloves, surgical mask, eyewear protection

## 2021-11-12 NOTE — PLAN OF CARE
Goal Outcome Evaluation:      Patient has been very impulsive and getting up without using call light and setting off bed alarm several times.  Nurse at Bedside for safety, education provided several times for use of call light with no evidence of learning.  Will continue to monitor

## 2021-11-12 NOTE — CASE MANAGEMENT/SOCIAL WORK
Continued Stay Note  KACEY Leonard     Patient Name: Devika Jones  MRN: [id number]  Today's Date: 11/12/2021    Admit Date: 11/10/2021     Discharge Plan     Row Name 11/12/21 1517       Plan    Patient/Family in Agreement with Plan yes    Plan Comments CM updated patient's sonTam ([phone]) that [facility] can accept tomorrow and MD's have been notified.              Phone communication or documentation only - no physical contact with patient or family.    Jesika Hernandez RN     Office Phone: [phone]  Office Cell: [phone]

## 2021-11-12 NOTE — PROGRESS NOTES
"     LOS: 1 day   Patient Care Team:  Nallely Jessica APRN as PCP - General (Nurse Practitioner)    Chief Complaint: Follow-up right hip    Subjective     Interval History:     History taken from: patient  Patient says she feels much better today than yesterday.  She still not comfortable by her self on a walker.    Objective     Vital Signs  Visit Vitals  /57 (BP Location: Right arm, Patient Position: Lying)   Pulse 73   Temp 98.3 °F (36.8 °C) (Oral)   Resp 16   Ht 175.3 cm (69\")   Wt 101 kg (221 lb 9.6 oz)   SpO2 91%   BMI 32.72 kg/m²       Physical Exam:        General Appearance:   She is by her self supine in bed.  Oriented to time place and person.   Extremities:  Legs are well aligned.  Dressing intact.   Pulses:  Deferred   Skin:  No abnormalities of her skin other than the surgical incision   Neurologic:  5/5 plantarflexion and dorsiflexion         Results Review:    CBC    Results from last 7 days   Lab Units 11/12/21  0236 11/11/21  0339   HEMOGLOBIN g/dL 10.2* 10.3*     BMP   Results from last 7 days   Lab Units 11/11/21  0339   SODIUM mmol/L 134*   POTASSIUM mmol/L 4.1   CHLORIDE mmol/L 100   CO2 mmol/L 25.0   BUN mg/dL 12   CREATININE mg/dL 0.47*   GLUCOSE mg/dL 112*     Infection     Radiology(recent) No radiology results for the last day    Imaging reviewed: No new orthopedic images taken since surgery    Medication Review:   Scheduled Meds:ascorbic acid, 500 mg, Oral, Daily  cetirizine, 10 mg, Oral, Daily  citalopram, 20 mg, Oral, Nightly  dicyclomine, 10 mg, Oral, TID  enoxaparin, 30 mg, Subcutaneous, Daily  levothyroxine, 75 mcg, Oral, Daily  multivitamin with minerals, 1 tablet, Oral, Daily      Continuous Infusions:lactated ringers, 75 mL/hr, Last Rate: Stopped (11/11/21 0619)      PRN Meds:.HYDROcodone-acetaminophen  •  HYDROmorphone **AND** naloxone  •  ondansetron **OR** ondansetron  •  oxyCODONE  •  zolpidem    Assessment/Plan       Osteoarthritis of right hip    Anxiety disorder    " Depressive disorder    Mixed hyperlipidemia    Hypothyroidism    Irritable bowel syndrome    Osteoporosis    Insomnia    Overweight (BMI 25.0-29.9)    Impression postop day #2 for right anterior total hip replacement  Recommendations #1 continue physical therapy #2 rehab anytime from the orthopedic point of view #3 office in 6 weeks.        Maximus Red MD  11/12/21  17:25 EST

## 2021-11-12 NOTE — PLAN OF CARE
Goal Outcome Evaluation:     Vitals WNL, no c/o pain at this time. Patient has ambulated to bathroom with walker and gait belt with little assistance. A&Ox4. No needs known at this time. Will continue to monitor.

## 2021-11-13 VITALS
WEIGHT: 221.6 LBS | SYSTOLIC BLOOD PRESSURE: 109 MMHG | DIASTOLIC BLOOD PRESSURE: 65 MMHG | HEIGHT: 69 IN | HEART RATE: 104 BPM | BODY MASS INDEX: 32.82 KG/M2 | TEMPERATURE: 98 F | RESPIRATION RATE: 17 BRPM | OXYGEN SATURATION: 93 %

## 2021-11-13 LAB
HCT VFR BLD AUTO: 25.4 % (ref 34–46.6)
HGB BLD-MCNC: 8.9 G/DL (ref 12–15.9)

## 2021-11-13 PROCEDURE — 25010000002 ENOXAPARIN PER 10 MG: Performed by: ORTHOPAEDIC SURGERY

## 2021-11-13 PROCEDURE — 97110 THERAPEUTIC EXERCISES: CPT

## 2021-11-13 PROCEDURE — 85018 HEMOGLOBIN: CPT | Performed by: ORTHOPAEDIC SURGERY

## 2021-11-13 PROCEDURE — 97116 GAIT TRAINING THERAPY: CPT

## 2021-11-13 PROCEDURE — 85014 HEMATOCRIT: CPT | Performed by: ORTHOPAEDIC SURGERY

## 2021-11-13 RX ORDER — HYDROCODONE BITARTRATE AND ACETAMINOPHEN 10; 325 MG/1; MG/1
1 TABLET ORAL EVERY 4 HOURS PRN
Qty: 60 TABLET | Refills: 0 | Status: SHIPPED | OUTPATIENT
Start: 2021-11-13

## 2021-11-13 RX ADMIN — LEVOTHYROXINE SODIUM 75 MCG: 0.07 TABLET ORAL at 03:48

## 2021-11-13 RX ADMIN — OXYCODONE 10 MG: 5 TABLET ORAL at 16:30

## 2021-11-13 RX ADMIN — OXYCODONE 10 MG: 5 TABLET ORAL at 03:48

## 2021-11-13 RX ADMIN — OXYCODONE HYDROCHLORIDE AND ACETAMINOPHEN 500 MG: 500 TABLET ORAL at 09:35

## 2021-11-13 RX ADMIN — DICYCLOMINE HYDROCHLORIDE 10 MG: 10 CAPSULE ORAL at 09:35

## 2021-11-13 RX ADMIN — ENOXAPARIN SODIUM 30 MG: 30 INJECTION SUBCUTANEOUS at 16:30

## 2021-11-13 RX ADMIN — CETIRIZINE HYDROCHLORIDE 10 MG: 10 TABLET, FILM COATED ORAL at 09:35

## 2021-11-13 RX ADMIN — MULTIPLE VITAMINS W/ MINERALS TAB 1 TABLET: TAB at 09:35

## 2021-11-13 NOTE — THERAPY TREATMENT NOTE
Subjective: Pt agreeable to therapeutic plan of care. Pt a little confused on sequence of events, felt it was evening and she slept all day.     Objective:     Bed mobility - Modified-Independent  Transfers - CGA and with rolling walker  Ambulation - 75 feet CGA and with rolling walker    Pain: c/o just a little pain R hip  Education: Provided education on importance of mobility and skilled verbal / tactile cueing throughout intervention.     Assessment: Devika Jones presents with functional mobility impairments which indicate the need for skilled intervention. Tolerating session today without incident. Pt a little slow moving and req vc's to keep L foot flat during R swing thru. Dependent on UE's. Will need rehab at [place].Will continue to follow and progress as tolerated.     Plan/Recommendations:   Pt would benefit from Inpatient Rehabilitation placement at discharge from facility and requires no DME at discharge.   Pt desires Inpatient Rehabilitation placement at discharge. Pt cooperative; agreeable to therapeutic recommendations and plan of care.     Basic Mobility 6-click:  Rollin = Total, A lot = 2, A little = 3; 4 = None  Supine>Sit:   1 = Total, A lot = 2, A little = 3; 4 = None   Sit>Stand with arms:  1 = Total, A lot = 2, A little = 3; 4 = None  Bed>Chair:   1 = Total, A lot = 2, A little = 3; 4 = None  Ambulate in room:  1 = Total, A lot = 2, A little = 3; 4 = None  3-5 Steps with railin = Total, A lot = 2, A little = 3; 4 = None  Score: 19      Post-Tx Position: Up in Chair, Alarms activated and Call light and personal items within reach  PPE: gloves, surgical mask, eyewear protection

## 2021-11-13 NOTE — PROGRESS NOTES
Discharge Planning Assessment   Horacio     Patient Name: Devika Jones  MRN: [id number]  Today's Date: 11/13/2021    Admit Date: 11/10/2021       Discharge Plan     Row Name 11/13/21 0927       Plan    Plan DC Plan: Accepted to [facility]. No PASRR or precert needed.    Plan Comments CM contacted by Sheela Sheriff. Bed available if pt medically ready for discharge. Secure chat sent to MD and primary nurse.              Continued Care and Services - Admitted Since 11/10/2021     Destination Coordination complete.    Service Provider Request Status Selected Services Address Phone Fax Patient Preferred    [facility]   Selected Inpatient Rehabilitation [address] [place] [address] [place] [id number] [phone]     [facility]  Pending - Request Sent N/A [address] [facility] [place] [address] [phone] --              Expected Discharge Date and Time     Expected Discharge Date Expected Discharge Time    Nov 12, 2021       Tatiana Aparicio RN

## 2021-11-13 NOTE — PROGRESS NOTES
"     LOS: 2 days   Patient Care Team:  Nallely Jessica APRN as PCP - General (Nurse Practitioner)    Chief Complaint: Follow-up right hip    Subjective     Interval History:     History taken from: patient  Patient says she feels a little bit better each day.  However she does not feel like she can go home by herself yet.    Objective     Vital Signs  Visit Vitals  /62 (BP Location: Right arm, Patient Position: Lying)   Pulse 96   Temp 98.6 °F (37 °C) (Oral)   Resp 18   Ht 175.3 cm (69\")   Wt 101 kg (221 lb 9.6 oz)   SpO2 91%   BMI 32.72 kg/m²       Physical Exam:        General Appearance:   She by her self.  Supine in bed.  Oriented to time place and person.   Extremities:  Legs are well aligned.  Dressing is intact.   Pulses:  Deferred   Skin:  Other than the surgical incision the skin is intact.   Neurologic:  5/5 plantarflexion and dorsiflexion.         Results Review:    CBC    Results from last 7 days   Lab Units 11/13/21  0311 11/12/21  0236 11/11/21  0339   HEMOGLOBIN g/dL 8.9* 10.2* 10.3*     BMP   Results from last 7 days   Lab Units 11/11/21  0339   SODIUM mmol/L 134*   POTASSIUM mmol/L 4.1   CHLORIDE mmol/L 100   CO2 mmol/L 25.0   BUN mg/dL 12   CREATININE mg/dL 0.47*   GLUCOSE mg/dL 112*     Infection     Radiology(recent) No radiology results for the last day    Imaging reviewed: No new orthopedic images taken since surgery.    Medication Review:   Scheduled Meds:ascorbic acid, 500 mg, Oral, Daily  cetirizine, 10 mg, Oral, Daily  citalopram, 20 mg, Oral, Nightly  dicyclomine, 10 mg, Oral, TID  enoxaparin, 30 mg, Subcutaneous, Daily  levothyroxine, 75 mcg, Oral, Daily  multivitamin with minerals, 1 tablet, Oral, Daily      Continuous Infusions:lactated ringers, 75 mL/hr, Last Rate: Stopped (11/11/21 0619)      PRN Meds:.HYDROcodone-acetaminophen  •  HYDROmorphone **AND** naloxone  •  ondansetron **OR** ondansetron  •  oxyCODONE  •  zolpidem    Assessment/Plan       Osteoarthritis of right " hip    Anxiety disorder    Depressive disorder    Mixed hyperlipidemia    Hypothyroidism    Irritable bowel syndrome    Osteoporosis    Insomnia    Overweight (BMI 25.0-29.9)    Impression #1 postop day #3 for right anterior total hip replacement  Recommendations #1 physical therapy #2 rehab anytime from the orthopedic point of view #3 office in 6 weeks.        Maximus Red MD  11/13/21  06:10 EST

## 2021-11-13 NOTE — DISCHARGE SUMMARY
Discharge summary  This patient is a 66-year-old female who had severe pain in her right hip.  She was admitted to the hospital for right total hip replacement.  This was done according the operative note.  She did well postoperatively.  She was begun on physical therapy gait training bed to chair transfers weightbearing as tolerated both lower extremities.  At the time of discharge she was not quite independent on this regimen so she was transferred to a rehab unit for continued therapy.  At the time of discharge her incision site was healing well with no signs of infection and she was afebrile.  She was tolerating a regular diet well.  He was discharged to the rehab unit on a regular diet, her prehospitalization medications, and was to continue therapy as prescribed in the hospital.  The rehab unit was instructed to call my office if any problems arose.  Otherwise we would see her in 6 weeks from the time of discharge.

## 2021-11-13 NOTE — PLAN OF CARE
Goal Outcome Evaluation:  Plan of Care Reviewed With: patient            Patient discharging to [facility] today for rehab.

## 2021-11-13 NOTE — PLAN OF CARE
Assessment: Devika Jones presents with functional mobility impairments which indicate the need for skilled intervention. Tolerating session today without incident. Pt a little slow moving and req vc's to keep L foot flat during R swing thru. Dependent on UE's. Will need rehab at [place].Will continue to follow and progress as tolerated.

## 2021-11-15 NOTE — CASE MANAGEMENT/SOCIAL WORK
Case Management Discharge Note      Final Note: [facility]    Provided Post Acute Provider List?: N/A    Selected Continued Care - Discharged on 11/13/2021 Admission date: 11/10/2021 - Discharge disposition: Rehab Facility or Unit (DC - External)    Destination Coordination complete.    Service Provider Selected Services Address Phone Fax Patient Preferred    [facility]  Inpatient Rehabilitation [address] [place] [address] [phone]            Final Discharge Disposition Code: 62 - inpatient rehab facility

## 2021-11-22 ENCOUNTER — TRANSCRIBE ORDERS (OUTPATIENT)
Dept: HOME HEALTH SERVICES | Facility: HOME HEALTHCARE | Age: 66
End: 2021-11-22

## 2021-11-22 ENCOUNTER — HOME HEALTH ADMISSION (OUTPATIENT)
Dept: HOME HEALTH SERVICES | Facility: HOME HEALTHCARE | Age: 66
End: 2021-11-22

## 2021-11-22 DIAGNOSIS — Z96.641 AFTERCARE FOLLOWING RIGHT HIP JOINT REPLACEMENT SURGERY: Primary | ICD-10-CM

## 2021-11-22 DIAGNOSIS — Z47.1 AFTERCARE FOLLOWING RIGHT HIP JOINT REPLACEMENT SURGERY: Primary | ICD-10-CM

## 2021-11-24 ENCOUNTER — HOME CARE VISIT (OUTPATIENT)
Dept: HOME HEALTH SERVICES | Facility: HOME HEALTHCARE | Age: 66
End: 2021-11-24

## 2021-11-24 PROCEDURE — G0299 HHS/HOSPICE OF RN EA 15 MIN: HCPCS

## 2021-11-25 VITALS
HEIGHT: 69 IN | TEMPERATURE: 98.4 F | RESPIRATION RATE: 20 BRPM | WEIGHT: 191 LBS | SYSTOLIC BLOOD PRESSURE: 120 MMHG | OXYGEN SATURATION: 98 % | BODY MASS INDEX: 28.29 KG/M2 | HEART RATE: 88 BPM | DIASTOLIC BLOOD PRESSURE: 68 MMHG

## 2021-11-25 NOTE — HOME HEALTH
66 year old female lives in 1 story home 3 steps to enter cluttered reports had been remodeling then  has developed dementia type issues and she hasnt been able to finish.  staying with his sister currently while she recuperates at home. Encouraged to use walker noted to move about some without gait unsteady. Underwent [facility] 11/10 reports they told her a nerve nicked severe pain down thigh burning and sharp. Reports at rehab they started her on Gabapentin and it has helped pain some. Noted edema in right leg and foot wearing shoes open toed with strap across edema marked where straps are. Inst. should wear a more supportive shoe for ambulation. Discussed home safety. Son and family come in to bring food and help as needed at least daily. Talkative which pt is known to this nurse and has always been. Reports hx. of hypothyroidism. Issues with stomach due to some meds in past. Reports has been told she has Barretts esophagus. Depression Adult deficit disorder reports does not take med now but did when worked out in public to control. Hx Fx. right ankle in past. Prior to surgery did everything for self. Main focus of care teach inst. post op care r.t. surgical aftercare on musculoskeletal system.

## 2021-11-26 ENCOUNTER — HOME CARE VISIT (OUTPATIENT)
Dept: HOME HEALTH SERVICES | Facility: HOME HEALTHCARE | Age: 66
End: 2021-11-26

## 2021-11-29 ENCOUNTER — HOME CARE VISIT (OUTPATIENT)
Dept: HOME HEALTH SERVICES | Facility: HOME HEALTHCARE | Age: 66
End: 2021-11-29

## 2021-11-29 VITALS
SYSTOLIC BLOOD PRESSURE: 130 MMHG | DIASTOLIC BLOOD PRESSURE: 82 MMHG | TEMPERATURE: 97.2 F | OXYGEN SATURATION: 99 % | HEART RATE: 90 BPM | RESPIRATION RATE: 18 BRPM

## 2021-11-29 PROCEDURE — G0151 HHCP-SERV OF PT,EA 15 MIN: HCPCS

## 2021-11-30 NOTE — HOME HEALTH
PT Evaluation Summary: The patient is a 66 year old female admitted to home health services by SN on 11/24/2021 after return home from IP Rehab following right total hip replacement on 11/10/2021 and now with reported radiating pain right thigh region.    Past Medical History includes: osteoarthritis, hypothyroidism, Barretts esophagus, Depression, Adult attention deficit disorder.    Prior Functional Level: Very active about the home and outside in the yard and garden per patient, ambulated without device.    Social History: Lives in home with 3 step entry with spouse.  Using Rolling walker (borrowed from spouse),     PT Assessment this day (11/29/2021) reveals the problems of right lower extremity weakness (hip groups 3-/5, knee extension 4/5), impaired transfers (requires stand-by assistance), abnormal gait (right antalgic gait), imbalance (Tinetti Balance Assessment score 15/28 - high falls risk).    The patient will benefit from home health PT to address these problems with interventions including therapeutic exercise, transfer training, gait training and patient education (including home safety and home exercise program (HEP) instruction).     Rehab potential good due to prior functional level.    Plan is to discharge with HEP with patient at a higher functional level.    Session Notes: Patient very pleasant, talkative and participates well in PT session. She consents to PT as planned.    Plan for next visit: Continue PT with advancement and/or adjustment of exercises as indicated and advancement of gait (with work on correcting unequal steps and slow gait speed).

## 2021-12-01 ENCOUNTER — HOME CARE VISIT (OUTPATIENT)
Dept: HOME HEALTH SERVICES | Facility: HOME HEALTHCARE | Age: 66
End: 2021-12-01

## 2021-12-02 ENCOUNTER — HOME CARE VISIT (OUTPATIENT)
Dept: HOME HEALTH SERVICES | Facility: HOME HEALTHCARE | Age: 66
End: 2021-12-02

## 2021-12-02 PROCEDURE — G0157 HHC PT ASSISTANT EA 15: HCPCS

## 2021-12-02 NOTE — CASE COMMUNICATION
Move visit to me on Thursday 12/2/2021 could not reach pt today by her phone it said wasnt taking calls not sure if she knew this or not will try tomorrow.

## 2021-12-03 ENCOUNTER — HOME CARE VISIT (OUTPATIENT)
Dept: HOME HEALTH SERVICES | Facility: HOME HEALTHCARE | Age: 66
End: 2021-12-03

## 2021-12-03 VITALS
SYSTOLIC BLOOD PRESSURE: 126 MMHG | OXYGEN SATURATION: 99 % | TEMPERATURE: 96.9 F | DIASTOLIC BLOOD PRESSURE: 82 MMHG | HEART RATE: 99 BPM

## 2021-12-03 PROCEDURE — G0152 HHCP-SERV OF OT,EA 15 MIN: HCPCS

## 2021-12-03 PROCEDURE — G0299 HHS/HOSPICE OF RN EA 15 MIN: HCPCS

## 2021-12-05 VITALS
RESPIRATION RATE: 16 BRPM | SYSTOLIC BLOOD PRESSURE: 126 MMHG | HEART RATE: 84 BPM | OXYGEN SATURATION: 96 % | DIASTOLIC BLOOD PRESSURE: 66 MMHG | TEMPERATURE: 97.8 F

## 2021-12-05 VITALS — OXYGEN SATURATION: 99 % | HEART RATE: 91 BPM | DIASTOLIC BLOOD PRESSURE: 70 MMHG | SYSTOLIC BLOOD PRESSURE: 120 MMHG

## 2021-12-06 NOTE — HOME HEALTH
Pt is a 66 y.o female who lives in a 1 story home with spouse.  Pt recently hospitalized secondary to R THR    PLOF pt independent with all ADLs      Currently pt independent with feeding grooming toileting bathing and dressing.  Pt requires MAX assist with IADLs      Skilled OT for transfer training,  Pt and therapist in agreement with goals and poc      Pt decided skilled OT no longer needed.  Therefore no orders entered    OT evaluation only

## 2021-12-07 ENCOUNTER — HOME CARE VISIT (OUTPATIENT)
Dept: HOME HEALTH SERVICES | Facility: HOME HEALTHCARE | Age: 66
End: 2021-12-07

## 2021-12-07 PROCEDURE — G0157 HHC PT ASSISTANT EA 15: HCPCS

## 2021-12-08 ENCOUNTER — HOME CARE VISIT (OUTPATIENT)
Dept: HOME HEALTH SERVICES | Facility: HOME HEALTHCARE | Age: 66
End: 2021-12-08

## 2021-12-08 VITALS
HEART RATE: 87 BPM | DIASTOLIC BLOOD PRESSURE: 82 MMHG | SYSTOLIC BLOOD PRESSURE: 132 MMHG | TEMPERATURE: 97.4 F | OXYGEN SATURATION: 99 %

## 2021-12-08 VITALS
DIASTOLIC BLOOD PRESSURE: 74 MMHG | TEMPERATURE: 98.7 F | RESPIRATION RATE: 20 BRPM | SYSTOLIC BLOOD PRESSURE: 112 MMHG | OXYGEN SATURATION: 98 % | HEART RATE: 80 BPM

## 2021-12-08 PROCEDURE — G0299 HHS/HOSPICE OF RN EA 15 MIN: HCPCS

## 2021-12-08 NOTE — HOME HEALTH
Moving about using walker gait steady reports still with nerve pain in right leg and hip.Some edema in legs which is pts norm. Calmer acting today kitchen less cluttered on floor reports son and daughter in law started cleaning for her, Incision edges well approximated. Pain 6/10 nerve pain hip 5/10

## 2021-12-09 ENCOUNTER — HOME CARE VISIT (OUTPATIENT)
Dept: HOME HEALTH SERVICES | Facility: HOME HEALTHCARE | Age: 66
End: 2021-12-09

## 2021-12-14 ENCOUNTER — HOME CARE VISIT (OUTPATIENT)
Dept: HOME HEALTH SERVICES | Facility: HOME HEALTHCARE | Age: 66
End: 2021-12-14

## 2021-12-14 VITALS
TEMPERATURE: 97.7 F | HEART RATE: 88 BPM | RESPIRATION RATE: 18 BRPM | OXYGEN SATURATION: 100 % | SYSTOLIC BLOOD PRESSURE: 122 MMHG | DIASTOLIC BLOOD PRESSURE: 72 MMHG

## 2021-12-14 PROCEDURE — G0151 HHCP-SERV OF PT,EA 15 MIN: HCPCS

## 2021-12-15 NOTE — HOME HEALTH
Session Notes: Patient reports persistent pain right upper thigh region but asserts pain overall better since PT evaluation.    Upcoming medical appointments:  Dr Red 12/20/2021    Plan for next visit: continue exercises with light progression only per patient tolerance and without pain exacerbation. In gait training work on increasing jonny.

## 2021-12-16 ENCOUNTER — HOME CARE VISIT (OUTPATIENT)
Dept: HOME HEALTH SERVICES | Facility: HOME HEALTHCARE | Age: 66
End: 2021-12-16

## 2021-12-16 VITALS
OXYGEN SATURATION: 96 % | RESPIRATION RATE: 20 BRPM | HEART RATE: 99 BPM | TEMPERATURE: 96.9 F | SYSTOLIC BLOOD PRESSURE: 132 MMHG | DIASTOLIC BLOOD PRESSURE: 70 MMHG

## 2021-12-16 PROCEDURE — G0151 HHCP-SERV OF PT,EA 15 MIN: HCPCS

## 2021-12-17 NOTE — HOME HEALTH
Session Notes: Patient reporting significantly improved pain control since last PT session.    Plan for next visit: Continue with currently established exercise program. Continue with progression to cane in gait.

## 2021-12-22 ENCOUNTER — HOME CARE VISIT (OUTPATIENT)
Dept: HOME HEALTH SERVICES | Facility: HOME HEALTHCARE | Age: 66
End: 2021-12-22

## 2021-12-22 PROCEDURE — G0157 HHC PT ASSISTANT EA 15: HCPCS

## 2021-12-23 ENCOUNTER — HOME CARE VISIT (OUTPATIENT)
Dept: HOME HEALTH SERVICES | Facility: HOME HEALTHCARE | Age: 66
End: 2021-12-23

## 2021-12-23 VITALS
TEMPERATURE: 97 F | HEART RATE: 83 BPM | OXYGEN SATURATION: 95 % | SYSTOLIC BLOOD PRESSURE: 112 MMHG | RESPIRATION RATE: 18 BRPM | DIASTOLIC BLOOD PRESSURE: 62 MMHG

## 2021-12-23 VITALS
TEMPERATURE: 97.6 F | DIASTOLIC BLOOD PRESSURE: 76 MMHG | HEART RATE: 79 BPM | OXYGEN SATURATION: 99 % | SYSTOLIC BLOOD PRESSURE: 124 MMHG

## 2021-12-23 PROCEDURE — G0151 HHCP-SERV OF PT,EA 15 MIN: HCPCS

## 2021-12-24 NOTE — HOME HEALTH
Session Notes: Patient reports that she was seen by Surgeon 12/20/2021. She is to return for follow up in 3 months. Patient denies any other new problems.    --- Start 30 day assessment ---  Clinical condition of patient at initial or last assessment:  Initial PT Assessment (11/29/2021) revealed the problems of right lower extremity weakness (hip groups 3-/5, knee extension 4/5), impaired transfers (requires stand-by assistance), abnormal gait (right antalgic gait), imbalance (Tinetti Balance Assessment score 15/28 - high falls risk).    Current clinical condition of patient:  PT assessment this day (12/23/2021) reveals improved right lower extremity strength (Right hip flexion/abduction/extension 4-/5, right knee extension 4+/5), improved transfers (independent with sit to stand and toilet transfers with other transfers requiring stand-by assistance for safety), limited ambulation (ambulates up to 120 feet with cane requiring up to minimal assistance), impaired but improved balance (Tinetti Balance Assessment score 20/28).    Overall progress towards measurable treatment goals:  The patient is making clear, measurable progress towards functional goals with notable improvement in transfers and gait.    Effectiveness of current plan:  Progress towards goals clearly shows effectiveness of PT to date.    Plan for continuing or discontinuing service:  Plan is to continue following current plan of care with projected discharge after 2 more visit (1WK2).    Changes to goals or care plan:  No need to change goals or plan of care.    Statement of expectation of continued progress toward goals:  Patient expected to meet all goals within established time frame.    Why is it necessary for therapy to continue?  Continued PT necessary to advance patient to a higher functional level to decrease falls risk and allow patient to remain living safely in the home setting.  --- End 30 day assessment ---    Plan for next visit: Continue  with gait training and standing lower extremity exercises with advancement as indicated.

## 2021-12-30 ENCOUNTER — HOME CARE VISIT (OUTPATIENT)
Dept: HOME HEALTH SERVICES | Facility: HOME HEALTHCARE | Age: 66
End: 2021-12-30

## 2022-01-06 ENCOUNTER — HOME CARE VISIT (OUTPATIENT)
Dept: HOME HEALTH SERVICES | Facility: HOME HEALTHCARE | Age: 67
End: 2022-01-06

## 2022-01-06 VITALS
DIASTOLIC BLOOD PRESSURE: 76 MMHG | SYSTOLIC BLOOD PRESSURE: 128 MMHG | HEART RATE: 76 BPM | TEMPERATURE: 97.3 F | OXYGEN SATURATION: 97 % | RESPIRATION RATE: 18 BRPM

## 2022-01-06 PROCEDURE — G0151 HHCP-SERV OF PT,EA 15 MIN: HCPCS

## 2022-01-07 NOTE — CASE COMMUNICATION
PT Discharge Summary: The patient is a 66 year old female admitted to home health services by SN on 11/24/2021 after return home from [facility] following right total hip replacement on 11/10/2021 and now with reported radiating pain right thigh region.    Initial PT Assessment (11/29/2021) revealed the problems of right lower extremity weakness (hip groups 3-/5, knee extension 4/5), impaired transfers (requires stand-by assistance), abno rmal gait (right antalgic gait), imbalance (Tinetti Balance Assessment score 15/28 - high falls risk).    The patient accepted the PT interventions of therapeutic exercise, transfer training, gait training and patient education (including home safety and home exercise program (HEP) instruction) meeting all established PT goals.    Patient discharged from home health services this day (1/6/2022) with patient consent due to all goals met.     Patient to follow up with surgeon as planned on 3/21/2022.

## 2022-01-07 NOTE — HOME HEALTH
PT Discharge Summary: The patient is a 66 year old female admitted to home health services by SN on 11/24/2021 after return home from [facility] following right total hip replacement on 11/10/2021 and now with reported radiating pain right thigh region.    Initial PT Assessment (11/29/2021) revealed the problems of right lower extremity weakness (hip groups 3-/5, knee extension 4/5), impaired transfers (requires stand-by assistance), abnormal gait (right antalgic gait), imbalance (Tinetti Balance Assessment score 15/28 - high falls risk).    The patient accepted the PT interventions of therapeutic exercise, transfer training, gait training and patient education (including home safety and home exercise program (HEP) instruction) meeting all established PT goals.    Patient discharged from home health services this day (1/6/2022) with patient consent due to all goals met.    Patient to follow up with surgeon as planned on 3/21/2022.    Session Notes: Patient asserts that she is doing very well and pain has decreased significantly. She consents to discharge from home health services after session today as planned.    Returns to surgeon, 3/21/2022

## 2024-01-01 NOTE — HOME HEALTH
Moving about house gait more steady today than at [facility]. Reports burning nerve pain otherwise hip pain improved. History/Exam/Medical decision making

## 2024-09-03 ENCOUNTER — HOSPITAL ENCOUNTER (EMERGENCY)
Facility: HOSPITAL | Age: 69
Discharge: HOME | End: 2024-09-04
Payer: MEDICARE

## 2024-09-03 DIAGNOSIS — S70.01XA CONTUSION OF RIGHT HIP, INITIAL ENCOUNTER: ICD-10-CM

## 2024-09-03 DIAGNOSIS — S50.01XA CONTUSION OF RIGHT ELBOW, INITIAL ENCOUNTER: ICD-10-CM

## 2024-09-03 DIAGNOSIS — S40.011A CONTUSION OF RIGHT SHOULDER, INITIAL ENCOUNTER: ICD-10-CM

## 2024-09-03 DIAGNOSIS — S52.351A CLOSED DISPLACED COMMINUTED FRACTURE OF SHAFT OF RIGHT RADIUS, INITIAL ENCOUNTER: Primary | ICD-10-CM

## 2024-09-03 DIAGNOSIS — M25.531 RIGHT WRIST PAIN: ICD-10-CM

## 2024-09-03 DIAGNOSIS — S52.611A CLOSED DISPLACED FRACTURE OF STYLOID PROCESS OF RIGHT ULNA, INITIAL ENCOUNTER: ICD-10-CM

## 2024-09-03 PROCEDURE — 99285 EMERGENCY DEPT VISIT HI MDM: CPT

## 2024-09-03 RX ORDER — HYDROCODONE BITARTRATE AND ACETAMINOPHEN 7.5; 325 MG/1; MG/1
1 TABLET ORAL ONCE
Status: COMPLETED | OUTPATIENT
Start: 2024-09-04 | End: 2024-09-04

## 2024-09-04 ENCOUNTER — APPOINTMENT (OUTPATIENT)
Dept: GENERAL RADIOLOGY | Facility: HOSPITAL | Age: 69
End: 2024-09-04
Payer: MEDICARE

## 2024-09-04 VITALS
WEIGHT: 188.49 LBS | HEIGHT: 69 IN | RESPIRATION RATE: 13 BRPM | OXYGEN SATURATION: 95 % | BODY MASS INDEX: 27.92 KG/M2 | DIASTOLIC BLOOD PRESSURE: 70 MMHG | HEART RATE: 85 BPM | SYSTOLIC BLOOD PRESSURE: 127 MMHG | TEMPERATURE: 97.9 F

## 2024-09-04 PROCEDURE — 25010000002 ONDANSETRON PER 1 MG: Performed by: PHYSICIAN ASSISTANT

## 2024-09-04 PROCEDURE — 73100 X-RAY EXAM OF WRIST: CPT

## 2024-09-04 PROCEDURE — 73120 X-RAY EXAM OF HAND: CPT

## 2024-09-04 PROCEDURE — 25010000002 MIDAZOLAM PER 1 MG: Performed by: PHYSICIAN ASSISTANT

## 2024-09-04 PROCEDURE — 96375 TX/PRO/DX INJ NEW DRUG ADDON: CPT

## 2024-09-04 PROCEDURE — 96374 THER/PROPH/DIAG INJ IV PUSH: CPT

## 2024-09-04 PROCEDURE — 73110 X-RAY EXAM OF WRIST: CPT

## 2024-09-04 PROCEDURE — 73502 X-RAY EXAM HIP UNI 2-3 VIEWS: CPT

## 2024-09-04 PROCEDURE — 25010000002 MORPHINE PER 10 MG: Performed by: PHYSICIAN ASSISTANT

## 2024-09-04 PROCEDURE — 73030 X-RAY EXAM OF SHOULDER: CPT

## 2024-09-04 PROCEDURE — 73070 X-RAY EXAM OF ELBOW: CPT

## 2024-09-04 RX ORDER — MIDAZOLAM HYDROCHLORIDE 1 MG/ML
2 INJECTION INTRAMUSCULAR; INTRAVENOUS ONCE
Status: COMPLETED | OUTPATIENT
Start: 2024-09-04 | End: 2024-09-04

## 2024-09-04 RX ORDER — SODIUM CHLORIDE 0.9 % (FLUSH) 0.9 %
10 SYRINGE (ML) INJECTION AS NEEDED
Status: DISCONTINUED | OUTPATIENT
Start: 2024-09-04 | End: 2024-09-04 | Stop reason: HOSPADM

## 2024-09-04 RX ORDER — ONDANSETRON 2 MG/ML
4 INJECTION INTRAMUSCULAR; INTRAVENOUS ONCE
Status: COMPLETED | OUTPATIENT
Start: 2024-09-04 | End: 2024-09-04

## 2024-09-04 RX ORDER — ETOMIDATE 2 MG/ML
10 INJECTION INTRAVENOUS ONCE
Status: COMPLETED | OUTPATIENT
Start: 2024-09-04 | End: 2024-09-04

## 2024-09-04 RX ORDER — OXYCODONE AND ACETAMINOPHEN 7.5; 325 MG/1; MG/1
1 TABLET ORAL EVERY 6 HOURS PRN
Qty: 15 TABLET | Refills: 0 | Status: SHIPPED | OUTPATIENT
Start: 2024-09-04

## 2024-09-04 RX ORDER — MORPHINE SULFATE 2 MG/ML
2 INJECTION, SOLUTION INTRAMUSCULAR; INTRAVENOUS ONCE
Status: COMPLETED | OUTPATIENT
Start: 2024-09-04 | End: 2024-09-04

## 2024-09-04 RX ADMIN — HYDROCODONE BITARTRATE AND ACETAMINOPHEN 1 TABLET: 7.5; 325 TABLET ORAL at 00:04

## 2024-09-04 RX ADMIN — ETOMIDATE 10 MG: 2 INJECTION INTRAVENOUS at 01:09

## 2024-09-04 RX ADMIN — MORPHINE SULFATE 2 MG: 2 INJECTION, SOLUTION INTRAMUSCULAR; INTRAVENOUS at 02:05

## 2024-09-04 RX ADMIN — MIDAZOLAM 2 MG: 1 INJECTION INTRAMUSCULAR; INTRAVENOUS at 01:08

## 2024-09-04 RX ADMIN — ONDANSETRON 4 MG: 2 INJECTION INTRAMUSCULAR; INTRAVENOUS at 02:05

## 2024-09-04 NOTE — ED PROVIDER NOTES
Subjective   History of Present Illness  Chief Complaint: Fall    Patient is a 69-year-old white female with past pertinent medical history of osteoporosis insomnia hypothyroidism presenting to the ER with mechanical fall happened this evening.  Patient states she was on her way to  mail from the mailbox and tripped over and landed on her right side.  Patient has not taken anything for pain and is currently reporting constant pain 7 out of 10.  Patient denies hitting her head or losing consciousness during fall.  Patient states she vomited couple hours ago and does not feel nauseated right now.  Patient does report of having tingling to the third, fourth, fifth digit of the right hand.  No chest pain shortness of breath nausea reported by the patient.    PCP: Sabina Rossi    History provided by:  Patient      Review of Systems   Constitutional:  Negative for fever.   HENT:  Negative for sore throat and trouble swallowing.    Eyes: Negative.    Respiratory:  Negative for shortness of breath and wheezing.    Cardiovascular:  Negative for chest pain.   Gastrointestinal:  Negative for abdominal pain.   Endocrine: Negative.    Genitourinary:  Negative for dysuria.   Musculoskeletal:  Positive for arthralgias. Negative for back pain and myalgias.   Skin:  Negative for rash.        Hematoma   Allergic/Immunologic: Negative.    Neurological:  Negative for weakness and headaches.   Psychiatric/Behavioral:  Negative for behavioral problems.    All other systems reviewed and are negative.      Past Medical History:   Diagnosis Date    ADD (attention deficit disorder)     Allergies     Arthritis     Samuel's esophagus     Cellulitis 2003    post hysterectomy    Cervical stenosis of spine     congenital    Delayed emergence from anesthesia     Depression     Diverticular disease mild    Frequent headaches     GERD (gastroesophageal reflux disease)     Hx of migraines     Hypothyroid     Insomnia     Osteopenia      Peripheral edema     PONV (postoperative nausea and vomiting)     Sleep apnea     cpap not using due to recall       Allergies   Allergen Reactions    Sulfa Antibiotics Shortness Of Breath     hives       Past Surgical History:   Procedure Laterality Date    COLONOSCOPY      multiple    ENDOSCOPY      dilatation yearly    HYSTERECTOMY  2003    cellulitis    INCISION AND DRAINAGE BREAST ABSCESS Left 1960    LUMBAR DISCECTOMY  1990    L4    TONSILLECTOMY  1968    TOTAL HIP ARTHROPLASTY Right 11/10/2021    Procedure: TOTAL HIP ARTHROPLASTY ANTERIOR;  Surgeon: Maximus Red MD;  Location: [facility] MAIN OR;  Service: Orthopedics;  Laterality: Right;    VAGINAL DELIVERY      post delivery bleeding       Family History   Family history unknown: Yes       Social History     Socioeconomic History    Marital status:    Tobacco Use    Smoking status: Never    Smokeless tobacco: Never   Substance and Sexual Activity    Alcohol use: Yes     Comment: rarely    Drug use: Never    Sexual activity: Defer           Objective   Physical Exam  Vitals and nursing note reviewed.   Constitutional:       Appearance: Normal appearance. She is well-developed and normal weight. She is not ill-appearing or toxic-appearing.   HENT:      Head: Normocephalic and atraumatic.   Eyes:      Pupils: Pupils are equal, round, and reactive to light.   Cardiovascular:      Rate and Rhythm: Normal rate and regular rhythm.      Pulses: Normal pulses.      Heart sounds: Normal heart sounds. No murmur heard.  Pulmonary:      Effort: Pulmonary effort is normal. No respiratory distress.      Breath sounds: Normal breath sounds. No wheezing.   Abdominal:      General: Bowel sounds are normal. There is no distension.      Palpations: Abdomen is soft.      Tenderness: There is no abdominal tenderness.   Musculoskeletal:         General: Swelling, tenderness and signs of injury present. No deformity.      Right lower leg: No edema.      Left lower leg: No  edema.      Comments: Tenderness to palpation anterior aspect of the right shoulder, posterior aspect of the right elbow.  Patient has moderate swelling, large hematoma to the radial aspect of the right wrist with deformity.  Radial pulses present 2+.  Sensation soft touch intact distally.  Cap refill appropriate.    Tenderness to palpation of the lateral aspect of the right hip, patient is full range of motion of hip and shoulder.   Skin:     General: Skin is warm and dry.      Capillary Refill: Capillary refill takes less than 2 seconds.      Findings: Bruising present. No rash.   Neurological:      General: No focal deficit present.      Mental Status: She is alert and oriented to person, place, and time.      Motor: No weakness.   Psychiatric:         Mood and Affect: Mood normal.         Behavior: Behavior normal.         FX Dislocation    Date/Time: 9/4/2024 1:54 AM    Performed by: Yvonne Denis PA  Authorized by: Yvonne Denis PA    Consent:     Consent obtained:  Written    Consent given by:  Patient    Risks discussed:  Nerve damage, pain and vascular damage  Universal protocol:     Imaging studies available: yes      Patient identity confirmed:  Verbally with patient  Injury:     Injury location:  Forearm    Forearm injury location:  R forearm    Forearm fracture type: distal radial    Pre-procedure details:     Distal neurologic exam:  Numbness    Distal perfusion: distal pulses strong      Range of motion: normal    Sedation:     Sedation type:  Moderate sedation  Procedure details:     Skeletal traction used: yes      X-ray confirmed reduction: yes      Immobilization:  Sling and splint    Splint type:  Sugar tong    Supplies used:  Elastic bandage, cotton padding and fiberglass    Additional details:  Improved alignment, assisted by Dr. Rodrigez    Attestation: Splint applied and adjusted personally by me    Post-procedure details:     Distal neurologic exam:  Normal    Distal perfusion: distal  "pulses strong      Procedure completion:  Tolerated    Fracture management: I provided definitive fracture management               ED Course    /64   Pulse 80   Temp 97.9 °F (36.6 °C) (Oral)   Resp 13   Ht 175.3 cm (69\")   Wt 85.5 kg (188 lb 7.9 oz)   SpO2 95%   BMI 27.84 kg/m²   Labs Reviewed - No data to display  Medications   sodium chloride 0.9 % flush 10 mL (has no administration in time range)   HYDROcodone-acetaminophen (NORCO) 7.5-325 MG per tablet 1 tablet (1 tablet Oral Given 9/4/24 0004)   midazolam (VERSED) injection 2 mg (2 mg Intravenous Given 9/4/24 0108)   etomidate (AMIDATE) injection 10 mg (10 mg Intravenous Given 9/4/24 0109)   ondansetron (ZOFRAN) injection 4 mg (4 mg Intravenous Given 9/4/24 0205)   morphine injection 2 mg (2 mg Intravenous Given 9/4/24 0205)     XR Wrist 2 View Right    Result Date: 9/4/2024  Impression: Interval placement of a fiberglass cast. There is slight improvement in the alignment of the comminuted distal radial fracture. Electronically Signed: Quan Lyons MD  9/4/2024 1:54 AM EDT  Workstation ID: [id number]    XR Elbow 2 View Right    Result Date: 9/4/2024  Impression: No acute abnormality. Electronically Signed: Quan Lyons MD  9/4/2024 12:41 AM EDT  Workstation ID: [id number]    XR Shoulder 2+ View Right    Result Date: 9/4/2024  Impression: Mild degenerative change. No acute fracture or dislocation. Electronically Signed: Quan Lyons MD  9/4/2024 12:40 AM EDT  Workstation ID: [id number]    XR Wrist 3+ View Right    Result Date: 9/4/2024  Impression: Comminuted and displaced fracture of the distal radius with dorsal displacement of the dominant distal fracture fragment. There is a small displaced fracture of the ulnar styloid. Electronically Signed: Quan Lyons MD  9/4/2024 12:39 AM EDT  Workstation ID: [id number]    XR Hip With or Without Pelvis 2 - 3 View Right    Result Date: 9/4/2024  Impression: Comminuted and displaced fracture of the distal radius " with dorsal displacement of the dominant distal fracture fragment. There is a small displaced fracture of the ulnar styloid. Electronically Signed: Quan Lyons MD  9/4/2024 12:39 AM EDT  Workstation ID: [id number]    XR Hand 2 View Right    Result Date: 9/4/2024  Impression: Comminuted and displaced fracture of the distal radius with dorsal displacement of the dominant distal fracture fragment. There is a small displaced fracture of the ulnar styloid. Electronically Signed: Quan Lyons MD  9/4/2024 12:39 AM EDT  Workstation ID: [id number]                                            Medical Decision Making  Differential Dx (Includes but not limited to): Fracture contusion dislocation    Chart Review: No pertinent records to review    While in the ED IV was placed and labs were obtained appropriate PPE was worn during exam and throughout all encounters with the patient.  Patient had the above evaluation.  She is afebrile nontoxic.  Acute distress.  She is always deformity of the right wrist.  X-ray imaging of the right shoulder elbow hip unremarkable.  X-ray imaging reviewed by myself and ER attending Dr. Rodrigez, comminuted displaced fracture of the distal radius with dorsal displacement of the dominant distal fracture fragment.  Conscious sedation performed and attempt to reduce the fracture to closer anatomic alignment.  Postreduction x-ray shows mild improvement.  Patient was placed in sugar-tong splint and sling for comfort.  She is neurovascular tact distally post splint placement.  Cap refill appropriate.  Patient be discharged with prescription for Percocet for pain.  She is to follow-up with PCP and Ortho for reevaluation and patient was advised that she would likely need surgical intervention.  Patient and son at bedside agreeable with plan for discharge.    Discharge plan and instructions were discussed with the patient who verbalized understanding and is in agreement with the plan, all questions were answered  at this time.  Patient is aware of signs symptoms that would require immediate return to the emergency room.  Patient understands importance of following up with primary care provider for further evaluation and worsening concerns as well as blood pressure recheck in the next 4 weeks.    Patient was discharged in improved stable condition with an upright steady gait.    Patient is aware that discharge does not mean that nothing is wrong but indicates no emergencies present and they must continue care with follow-up as given below or physician of their choice.    Problems Addressed:  Closed displaced comminuted fracture of shaft of right radius, initial encounter: acute illness or injury  Closed displaced fracture of styloid process of right ulna, initial encounter: acute illness or injury  Contusion of right elbow, initial encounter: acute illness or injury  Contusion of right hip, initial encounter: acute illness or injury  Contusion of right shoulder, initial encounter: acute illness or injury  Right wrist pain: acute illness or injury    Amount and/or Complexity of Data Reviewed  Radiology: ordered.    Risk  Prescription drug management.  Parenteral controlled substances.  Drug therapy requiring intensive monitoring for toxicity.        Final diagnoses:   Closed displaced comminuted fracture of shaft of right radius, initial encounter   Closed displaced fracture of styloid process of right ulna, initial encounter   Right wrist pain   Contusion of right elbow, initial encounter   Contusion of right shoulder, initial encounter   Contusion of right hip, initial encounter       ED Disposition  ED Disposition       ED Disposition   Discharge    Condition   Stable    Comment   --               Sabina Rossi APRN  [address] [facility] [place] [address] [phone]    Schedule an appointment as soon as possible for a visit in 2 days  As needed, If symptoms worsen    Luther Graham II, MD  [address]  [place] [address] [phone]    Schedule an appointment as soon as possible for a visit in 2 days  As needed, If symptoms worsen    [facility] - [address]  [facility] [address] [phone]  Schedule an appointment as soon as possible for a visit in 2 days  As needed, If symptoms worsen         Medication List        New Prescriptions      oxyCODONE-acetaminophen 7.5-325 MG per tablet  Commonly known as: PERCOCET  Take 1 tablet by mouth Every 6 (Six) Hours As Needed for Moderate Pain.            Stop      acetaminophen-codeine 300-30 MG per tablet  Commonly known as: TYLENOL #3     HYDROcodone-acetaminophen  MG per tablet  Commonly known as: NORCO               Where to Get Your Medications        These medications were sent to [id number] - [place], [place] - [address] AT [facility] OF [facility] - [phone]  - [phone]   [address] [place] [address]      Phone: [phone]   oxyCODONE-acetaminophen 7.5-325 MG per tablet            Yvonne Denis PA  09/04/24 0206

## 2024-09-04 NOTE — DISCHARGE INSTRUCTIONS
Take Percocet as needed for severe pain.    Keep splint in place until you are seen by the orthopedic specialist.  Do not get the splint wet.    Follow-up with orthopedic specialist this week, call their office tomorrow to schedule an appointment.  Follow-up with primary care for recheck    Return to the ER for new or worsening symptoms

## 2024-09-09 ENCOUNTER — LAB (OUTPATIENT)
Dept: LAB | Facility: HOSPITAL | Age: 69
End: 2024-09-09
Payer: MEDICARE

## 2024-09-09 ENCOUNTER — TRANSCRIBE ORDERS (OUTPATIENT)
Dept: ADMINISTRATIVE | Facility: HOSPITAL | Age: 69
End: 2024-09-09
Payer: MEDICARE

## 2024-09-09 ENCOUNTER — HOSPITAL ENCOUNTER (OUTPATIENT)
Dept: CARDIOLOGY | Facility: HOSPITAL | Age: 69
Discharge: HOME | End: 2024-09-09
Payer: MEDICARE

## 2024-09-09 DIAGNOSIS — S52.601A CLOSED FRACTURE OF RIGHT DISTAL RADIUS AND ULNA, INITIAL ENCOUNTER: ICD-10-CM

## 2024-09-09 DIAGNOSIS — S52.501A CLOSED FRACTURE OF RIGHT DISTAL RADIUS AND ULNA, INITIAL ENCOUNTER: ICD-10-CM

## 2024-09-09 DIAGNOSIS — S52.501A CLOSED FRACTURE OF RIGHT DISTAL RADIUS AND ULNA, INITIAL ENCOUNTER: Primary | ICD-10-CM

## 2024-09-09 DIAGNOSIS — S52.601A CLOSED FRACTURE OF RIGHT DISTAL RADIUS AND ULNA, INITIAL ENCOUNTER: Primary | ICD-10-CM

## 2024-09-09 LAB
ANION GAP SERPL CALCULATED.3IONS-SCNC: 11.1 MMOL/L (ref 5–15)
BASOPHILS # BLD AUTO: 0.07 10*3/MM3 (ref 0–0.2)
BASOPHILS NFR BLD AUTO: 0.7 % (ref 0–1.5)
BUN SERPL-MCNC: 19 MG/DL (ref 8–23)
BUN/CREAT SERPL: 28.4 (ref 7–25)
CALCIUM SPEC-SCNC: 9.1 MG/DL (ref 8.6–10.5)
CHLORIDE SERPL-SCNC: 101 MMOL/L (ref 98–107)
CO2 SERPL-SCNC: 24.9 MMOL/L (ref 22–29)
CREAT SERPL-MCNC: 0.67 MG/DL (ref 0.57–1)
DEPRECATED RDW RBC AUTO: 47.7 FL (ref 37–54)
EGFRCR SERPLBLD CKD-EPI 2021: 94.2 ML/MIN/1.73
EOSINOPHIL # BLD AUTO: 0.22 10*3/MM3 (ref 0–0.4)
EOSINOPHIL NFR BLD AUTO: 2.3 % (ref 0.3–6.2)
ERYTHROCYTE [DISTWIDTH] IN BLOOD BY AUTOMATED COUNT: 13.5 % (ref 12.3–15.4)
GLUCOSE SERPL-MCNC: 80 MG/DL (ref 65–99)
HCT VFR BLD AUTO: 37.2 % (ref 34–46.6)
HGB BLD-MCNC: 11.8 G/DL (ref 12–15.9)
IMM GRANULOCYTES # BLD AUTO: 0.04 10*3/MM3 (ref 0–0.05)
IMM GRANULOCYTES NFR BLD AUTO: 0.4 % (ref 0–0.5)
LYMPHOCYTES # BLD AUTO: 1.94 10*3/MM3 (ref 0.7–3.1)
LYMPHOCYTES NFR BLD AUTO: 20.6 % (ref 19.6–45.3)
MCH RBC QN AUTO: 30.6 PG (ref 26.6–33)
MCHC RBC AUTO-ENTMCNC: 31.7 G/DL (ref 31.5–35.7)
MCV RBC AUTO: 96.4 FL (ref 79–97)
MONOCYTES # BLD AUTO: 1.01 10*3/MM3 (ref 0.1–0.9)
MONOCYTES NFR BLD AUTO: 10.7 % (ref 5–12)
NEUTROPHILS NFR BLD AUTO: 6.13 10*3/MM3 (ref 1.7–7)
NEUTROPHILS NFR BLD AUTO: 65.3 % (ref 42.7–76)
NRBC BLD AUTO-RTO: 0 /100 WBC (ref 0–0.2)
PLATELET # BLD AUTO: 322 10*3/MM3 (ref 140–450)
PMV BLD AUTO: 10 FL (ref 6–12)
POTASSIUM SERPL-SCNC: 4.1 MMOL/L (ref 3.5–5.2)
QT INTERVAL: 359 MS
QTC INTERVAL: 437 MS
RBC # BLD AUTO: 3.86 10*6/MM3 (ref 3.77–5.28)
SODIUM SERPL-SCNC: 137 MMOL/L (ref 136–145)
WBC NRBC COR # BLD AUTO: 9.41 10*3/MM3 (ref 3.4–10.8)

## 2024-09-09 PROCEDURE — 93005 ELECTROCARDIOGRAM TRACING: CPT | Performed by: ORTHOPAEDIC SURGERY

## 2024-09-09 PROCEDURE — 36415 COLL VENOUS BLD VENIPUNCTURE: CPT

## 2024-09-09 PROCEDURE — 85025 COMPLETE CBC W/AUTO DIFF WBC: CPT

## 2024-09-09 PROCEDURE — 80048 BASIC METABOLIC PNL TOTAL CA: CPT

## (undated) DEVICE — 3M™ MICROFOAM™ SURGICAL TAPE 4 ROLLS/CARTON 6 CARTONS/CASE 1528-3: Brand: 3M™ MICROFOAM™

## (undated) DEVICE — GLV SURG DERMASSURE GRN LF PF 8.5

## (undated) DEVICE — GLV SURG SIGNATURE ESSENTIAL PF LTX SZ8.5

## (undated) DEVICE — SUT VIC 0 CP1 27IN J267H

## (undated) DEVICE — PK TOTL HIP 50

## (undated) DEVICE — PAD,ABDOMINAL,5"X9",STERILE,LF,1/PK: Brand: MEDLINE INDUSTRIES, INC.

## (undated) DEVICE — GAUZE,SPONGE,FLUFF,6"X6.75",STRL,5/TRAY: Brand: MEDLINE

## (undated) DEVICE — PENCL EVAC ULTRAVAC SMOKE W/BLD

## (undated) DEVICE — DUAL CUT SAGITTAL BLADE

## (undated) DEVICE — WRAP COMPR HIP COLD THERP

## (undated) DEVICE — SOL IRRIG NACL 1000ML

## (undated) DEVICE — KT SURG TURNOVER 050

## (undated) DEVICE — HOOD: Brand: FLYTE

## (undated) DEVICE — SOLUTION,WATER,IRRIGATION,1000ML,STERILE: Brand: MEDLINE

## (undated) DEVICE — C-ARM: Brand: UNBRANDED

## (undated) DEVICE — KT PT POSITION SUPINE HANA/PROFX TABL

## (undated) DEVICE — TOTAL TRAY, DB, 100% SILI FOLEY, 16FR 10: Brand: MEDLINE

## (undated) DEVICE — SOL IRR NACL 0.9PCT ARTHROMATIC 3000ML

## (undated) DEVICE — PAD CAST SYN PROTOUCH RL 4IN NS

## (undated) DEVICE — STAPLER, SKIN, 35W, A: Brand: MEDLINE INDUSTRIES, INC.

## (undated) DEVICE — SUT VIC COAT 1 CP-1 27IN